# Patient Record
Sex: FEMALE | Race: WHITE | NOT HISPANIC OR LATINO
[De-identification: names, ages, dates, MRNs, and addresses within clinical notes are randomized per-mention and may not be internally consistent; named-entity substitution may affect disease eponyms.]

---

## 2018-04-30 PROBLEM — Z00.00 ENCOUNTER FOR PREVENTIVE HEALTH EXAMINATION: Status: ACTIVE | Noted: 2018-04-30

## 2019-01-27 ENCOUNTER — TRANSCRIPTION ENCOUNTER (OUTPATIENT)
Age: 74
End: 2019-01-27

## 2019-08-21 ENCOUNTER — TRANSCRIPTION ENCOUNTER (OUTPATIENT)
Age: 74
End: 2019-08-21

## 2022-06-20 ENCOUNTER — NON-APPOINTMENT (OUTPATIENT)
Age: 77
End: 2022-06-20

## 2022-09-13 ENCOUNTER — NON-APPOINTMENT (OUTPATIENT)
Age: 77
End: 2022-09-13

## 2022-11-21 ENCOUNTER — NON-APPOINTMENT (OUTPATIENT)
Age: 77
End: 2022-11-21

## 2022-11-28 ENCOUNTER — NON-APPOINTMENT (OUTPATIENT)
Age: 77
End: 2022-11-28

## 2022-12-01 ENCOUNTER — NON-APPOINTMENT (OUTPATIENT)
Age: 77
End: 2022-12-01

## 2023-01-22 ENCOUNTER — NON-APPOINTMENT (OUTPATIENT)
Age: 78
End: 2023-01-22

## 2023-05-16 ENCOUNTER — APPOINTMENT (OUTPATIENT)
Dept: ORTHOPEDIC SURGERY | Facility: CLINIC | Age: 78
End: 2023-05-16
Payer: MEDICARE

## 2023-05-16 VITALS — WEIGHT: 138 LBS | BODY MASS INDEX: 23.56 KG/M2 | HEIGHT: 64 IN

## 2023-05-16 PROCEDURE — 99204 OFFICE O/P NEW MOD 45 MIN: CPT | Mod: 25

## 2023-05-16 PROCEDURE — 20550 NJX 1 TENDON SHEATH/LIGAMENT: CPT | Mod: F1

## 2023-05-16 PROCEDURE — 20605 DRAIN/INJ JOINT/BURSA W/O US: CPT | Mod: LT

## 2023-05-16 PROCEDURE — 73130 X-RAY EXAM OF HAND: CPT | Mod: LT

## 2023-05-16 NOTE — IMAGING
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

## 2023-05-16 NOTE — HISTORY OF PRESENT ILLNESS
[10] : 10 [3] : 3 [Burning] : burning [Dull/Aching] : dull/aching [Radiating] : radiating [Sharp] : sharp [Tightness] : tightness [Rest] : rest [Meds] : meds [de-identified] : 5/16/23:  A step fell on the patient's left hand in the end of November.  Pt had laceration that was sutured but she still has pain in her hand. [] : no [FreeTextEntry1] : left hand [FreeTextEntry3] : November 2022 [FreeTextEntry5] : A wooden block fell on the patients hand causing her to get stitches. [FreeTextEntry7] : wrist at times

## 2023-05-30 ENCOUNTER — APPOINTMENT (OUTPATIENT)
Dept: ORTHOPEDIC SURGERY | Facility: CLINIC | Age: 78
End: 2023-05-30
Payer: MEDICARE

## 2023-05-30 VITALS — BODY MASS INDEX: 23.56 KG/M2 | WEIGHT: 138 LBS | HEIGHT: 64 IN

## 2023-05-30 DIAGNOSIS — M19.132 POST-TRAUMATIC OSTEOARTHRITIS, LEFT WRIST: ICD-10-CM

## 2023-05-30 DIAGNOSIS — M72.0 PALMAR FASCIAL FIBROMATOSIS [DUPUYTREN]: ICD-10-CM

## 2023-05-30 DIAGNOSIS — Z86.39 PERSONAL HISTORY OF OTHER ENDOCRINE, NUTRITIONAL AND METABOLIC DISEASE: ICD-10-CM

## 2023-05-30 DIAGNOSIS — M65.342 TRIGGER FINGER, LEFT RING FINGER: ICD-10-CM

## 2023-05-30 DIAGNOSIS — Z87.898 PERSONAL HISTORY OF OTHER SPECIFIED CONDITIONS: ICD-10-CM

## 2023-05-30 DIAGNOSIS — M18.12 UNILATERAL PRIMARY OSTEOARTHRITIS OF FIRST CARPOMETACARPAL JOINT, LEFT HAND: ICD-10-CM

## 2023-05-30 PROCEDURE — 20605 DRAIN/INJ JOINT/BURSA W/O US: CPT | Mod: LT

## 2023-05-30 PROCEDURE — L3908: CPT

## 2023-05-30 PROCEDURE — 99214 OFFICE O/P EST MOD 30 MIN: CPT | Mod: 25

## 2023-05-30 NOTE — HISTORY OF PRESENT ILLNESS
[de-identified] : 5/30/23:  pt no longer has triggering in left index finger after CSI.  Pt was given CSI in left DRUJ and still has pain.  Pt also has left basal joint pain, pt has not received CSI there.\par \par 5/16/23:  A step fell on the patient's left hand in the end of November.  Pt had laceration that was sutured but she still has pain in her hand.

## 2023-05-30 NOTE — IMAGING
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

## 2023-05-30 NOTE — ASSESSMENT
[FreeTextEntry1] : After a discussion of the risks, benefits and alternatives along with the expectations, the patient was amenable to injection.  The indication for injection is pain, inflammation and radiographically confirmed arthritis.  The skin overlying the carpometacarpal joint was prepared with alcohol and ethyl chloride was sprayed topically.  Sterile technique was used. An injection of 1ml of lidocaine and 6mg of betamethasone was used.  The patient was instructed to call if redness, pain or fever occur.  They may apply ice for 15 minutes eery hour for the next 12-24 hours as tolerated.  .  The patient understands that it may take 2-5 days to see a noticeable difference.  Sterile Band-Aid was applied.\par \par Pt was given CSI in left basal joint today.\par Pt will wear wrist brace

## 2023-07-13 ENCOUNTER — OFFICE (OUTPATIENT)
Facility: LOCATION | Age: 78
Setting detail: OPHTHALMOLOGY
End: 2023-07-13
Payer: MEDICARE

## 2023-07-13 DIAGNOSIS — H02.831: ICD-10-CM

## 2023-07-13 DIAGNOSIS — H40.013: ICD-10-CM

## 2023-07-13 DIAGNOSIS — H02.834: ICD-10-CM

## 2023-07-13 DIAGNOSIS — H43.813: ICD-10-CM

## 2023-07-13 DIAGNOSIS — H16.223: ICD-10-CM

## 2023-07-13 DIAGNOSIS — H11.823: ICD-10-CM

## 2023-07-13 DIAGNOSIS — H81.4: ICD-10-CM

## 2023-07-13 PROCEDURE — 92133 CPTRZD OPH DX IMG PST SGM ON: CPT | Performed by: OPHTHALMOLOGY

## 2023-07-13 PROCEDURE — 92202 OPSCPY EXTND ON/MAC DRAW: CPT | Performed by: OPHTHALMOLOGY

## 2023-07-13 PROCEDURE — 92014 COMPRE OPH EXAM EST PT 1/>: CPT | Performed by: OPHTHALMOLOGY

## 2023-07-13 ASSESSMENT — REFRACTION_MANIFEST
OD_ADD: +2.75
OD_VA1: 20/20
OD_CYLINDER: -1.25
OD_AXIS: 125
OD_VA1: 20/20
OS_AXIS: 85
OD_VA2: 20/20
OD_SPHERE: +0.50
OS_ADD: +2.75
OU_VA: 20/15-2
OS_VA1: 20/20
OD_CYLINDER: -1.25
OS_SPHERE: +0.25
OS_CYLINDER: -0.75
OD_AXIS: 120
OD_SPHERE: +0.75

## 2023-07-13 ASSESSMENT — REFRACTION_AUTOREFRACTION
OD_AXIS: 103
OD_SPHERE: +1.00
OS_AXIS: 078
OS_CYLINDER: -1.75
OD_CYLINDER: -2.00
OS_SPHERE: +1.00

## 2023-07-13 ASSESSMENT — TONOMETRY
OS_IOP_MMHG: 18
OD_IOP_MMHG: 19

## 2023-07-13 ASSESSMENT — KERATOMETRY
OS_K2POWER_DIOPTERS: 45.00
OS_K1POWER_DIOPTERS: 46.00
OS_AXISANGLE_DEGREES: 071
OD_K2POWER_DIOPTERS: 44.00
OD_AXISANGLE_DEGREES: 106
OD_K1POWER_DIOPTERS: 45.75

## 2023-07-13 ASSESSMENT — REFRACTION_CURRENTRX
OD_CYLINDER: -1.25
OS_SPHERE: +0.25
OD_AXIS: 128
OD_SPHERE: +0.50
OS_CYLINDER: -0.75
OD_OVR_VA: 20/
OS_AXIS: 002
OS_OVR_VA: 20/

## 2023-07-13 ASSESSMENT — SUPERFICIAL PUNCTATE KERATITIS (SPK)
OD_SPK: 2+
OS_SPK: 2+

## 2023-07-13 ASSESSMENT — SPHEQUIV_DERIVED
OD_SPHEQUIV: 0
OS_SPHEQUIV: -0.125
OS_SPHEQUIV: 0.125
OD_SPHEQUIV: -0.125
OD_SPHEQUIV: 0.125

## 2023-07-13 ASSESSMENT — AXIALLENGTH_DERIVED
OD_AL: 23.1442
OS_AL: 22.9254
OD_AL: 23.0974
OS_AL: 22.8338
OD_AL: 23.0509

## 2023-07-13 ASSESSMENT — LID EXAM ASSESSMENTS: OD_COMMENTS: INFERIOR PLUG IN PLACE

## 2023-07-13 ASSESSMENT — CONFRONTATIONAL VISUAL FIELD TEST (CVF)
OS_FINDINGS: FULL
OD_FINDINGS: FULL

## 2023-07-13 ASSESSMENT — LID POSITION - DERMATOCHALASIS
OS_DERMATOCHALASIS: LUL 3+
OD_DERMATOCHALASIS: RUL 3+

## 2023-07-13 ASSESSMENT — VISUAL ACUITY
OD_BCVA: 20/20-1
OS_BCVA: 20/20

## 2023-09-20 ENCOUNTER — NON-APPOINTMENT (OUTPATIENT)
Age: 78
End: 2023-09-20

## 2023-10-16 ENCOUNTER — NON-APPOINTMENT (OUTPATIENT)
Age: 78
End: 2023-10-16

## 2023-11-07 ENCOUNTER — NON-APPOINTMENT (OUTPATIENT)
Age: 78
End: 2023-11-07

## 2023-11-10 ENCOUNTER — NON-APPOINTMENT (OUTPATIENT)
Age: 78
End: 2023-11-10

## 2023-11-13 PROBLEM — Z80.8 FAMILY HISTORY OF MALIGNANT NEOPLASM OF THYROID: Status: ACTIVE | Noted: 2023-11-13

## 2023-11-15 ENCOUNTER — APPOINTMENT (OUTPATIENT)
Dept: GYNECOLOGIC ONCOLOGY | Facility: CLINIC | Age: 78
End: 2023-11-15
Payer: MEDICARE

## 2023-11-15 VITALS
WEIGHT: 135 LBS | DIASTOLIC BLOOD PRESSURE: 67 MMHG | HEART RATE: 67 BPM | SYSTOLIC BLOOD PRESSURE: 142 MMHG | BODY MASS INDEX: 23.17 KG/M2

## 2023-11-15 DIAGNOSIS — Z80.8 FAMILY HISTORY OF MALIGNANT NEOPLASM OF OTHER ORGANS OR SYSTEMS: ICD-10-CM

## 2023-11-15 PROCEDURE — 99204 OFFICE O/P NEW MOD 45 MIN: CPT

## 2023-11-15 RX ORDER — LEVOTHYROXINE SODIUM 88 UG/1
88 TABLET ORAL
Refills: 0 | Status: ACTIVE | COMMUNITY

## 2023-11-17 ENCOUNTER — NON-APPOINTMENT (OUTPATIENT)
Age: 78
End: 2023-11-17

## 2023-11-27 ENCOUNTER — RESULT REVIEW (OUTPATIENT)
Age: 78
End: 2023-11-27

## 2023-11-27 ENCOUNTER — NON-APPOINTMENT (OUTPATIENT)
Age: 78
End: 2023-11-27

## 2023-11-27 ENCOUNTER — OUTPATIENT (OUTPATIENT)
Dept: OUTPATIENT SERVICES | Facility: HOSPITAL | Age: 78
LOS: 1 days | End: 2023-11-27
Payer: COMMERCIAL

## 2023-11-27 DIAGNOSIS — N95.0 POSTMENOPAUSAL BLEEDING: ICD-10-CM

## 2023-11-27 PROCEDURE — 88321 CONSLTJ&REPRT SLD PREP ELSWR: CPT

## 2023-12-05 ENCOUNTER — APPOINTMENT (OUTPATIENT)
Dept: GYNECOLOGIC ONCOLOGY | Facility: CLINIC | Age: 78
End: 2023-12-05
Payer: MEDICARE

## 2023-12-05 VITALS
SYSTOLIC BLOOD PRESSURE: 150 MMHG | WEIGHT: 130 LBS | BODY MASS INDEX: 22.2 KG/M2 | DIASTOLIC BLOOD PRESSURE: 78 MMHG | HEART RATE: 69 BPM | HEIGHT: 64 IN

## 2023-12-05 PROCEDURE — 99214 OFFICE O/P EST MOD 30 MIN: CPT

## 2023-12-06 ENCOUNTER — OUTPATIENT (OUTPATIENT)
Dept: OUTPATIENT SERVICES | Facility: HOSPITAL | Age: 78
LOS: 1 days | End: 2023-12-06
Payer: MEDICARE

## 2023-12-06 VITALS
HEART RATE: 68 BPM | TEMPERATURE: 98 F | SYSTOLIC BLOOD PRESSURE: 155 MMHG | DIASTOLIC BLOOD PRESSURE: 77 MMHG | RESPIRATION RATE: 16 BRPM | HEIGHT: 63 IN | OXYGEN SATURATION: 99 % | WEIGHT: 130.07 LBS

## 2023-12-06 DIAGNOSIS — Z90.11 ACQUIRED ABSENCE OF RIGHT BREAST AND NIPPLE: Chronic | ICD-10-CM

## 2023-12-06 DIAGNOSIS — C54.1 MALIGNANT NEOPLASM OF ENDOMETRIUM: ICD-10-CM

## 2023-12-06 DIAGNOSIS — I10 ESSENTIAL (PRIMARY) HYPERTENSION: ICD-10-CM

## 2023-12-06 DIAGNOSIS — C73 MALIGNANT NEOPLASM OF THYROID GLAND: ICD-10-CM

## 2023-12-06 DIAGNOSIS — E89.0 POSTPROCEDURAL HYPOTHYROIDISM: Chronic | ICD-10-CM

## 2023-12-06 DIAGNOSIS — C50.919 MALIGNANT NEOPLASM OF UNSPECIFIED SITE OF UNSPECIFIED FEMALE BREAST: ICD-10-CM

## 2023-12-06 DIAGNOSIS — E78.5 HYPERLIPIDEMIA, UNSPECIFIED: ICD-10-CM

## 2023-12-06 DIAGNOSIS — Z98.890 OTHER SPECIFIED POSTPROCEDURAL STATES: Chronic | ICD-10-CM

## 2023-12-06 DIAGNOSIS — F41.9 ANXIETY DISORDER, UNSPECIFIED: ICD-10-CM

## 2023-12-06 LAB
A1C WITH ESTIMATED AVERAGE GLUCOSE RESULT: 5.7 % — HIGH (ref 4–5.6)
A1C WITH ESTIMATED AVERAGE GLUCOSE RESULT: 5.7 % — HIGH (ref 4–5.6)
ALBUMIN SERPL ELPH-MCNC: 3.4 G/DL — SIGNIFICANT CHANGE UP (ref 3.3–5)
ALBUMIN SERPL ELPH-MCNC: 3.4 G/DL — SIGNIFICANT CHANGE UP (ref 3.3–5)
ALP SERPL-CCNC: 86 U/L — SIGNIFICANT CHANGE UP (ref 40–120)
ALP SERPL-CCNC: 86 U/L — SIGNIFICANT CHANGE UP (ref 40–120)
ALT FLD-CCNC: 5 U/L — SIGNIFICANT CHANGE UP (ref 4–33)
ALT FLD-CCNC: 5 U/L — SIGNIFICANT CHANGE UP (ref 4–33)
ANION GAP SERPL CALC-SCNC: 10 MMOL/L — SIGNIFICANT CHANGE UP (ref 7–14)
ANION GAP SERPL CALC-SCNC: 10 MMOL/L — SIGNIFICANT CHANGE UP (ref 7–14)
AST SERPL-CCNC: 11 U/L — SIGNIFICANT CHANGE UP (ref 4–32)
AST SERPL-CCNC: 11 U/L — SIGNIFICANT CHANGE UP (ref 4–32)
BILIRUB SERPL-MCNC: 0.5 MG/DL — SIGNIFICANT CHANGE UP (ref 0.2–1.2)
BILIRUB SERPL-MCNC: 0.5 MG/DL — SIGNIFICANT CHANGE UP (ref 0.2–1.2)
BLD GP AB SCN SERPL QL: NEGATIVE — SIGNIFICANT CHANGE UP
BLD GP AB SCN SERPL QL: NEGATIVE — SIGNIFICANT CHANGE UP
BUN SERPL-MCNC: 6 MG/DL — LOW (ref 7–23)
BUN SERPL-MCNC: 6 MG/DL — LOW (ref 7–23)
CALCIUM SERPL-MCNC: 8.7 MG/DL — SIGNIFICANT CHANGE UP (ref 8.4–10.5)
CALCIUM SERPL-MCNC: 8.7 MG/DL — SIGNIFICANT CHANGE UP (ref 8.4–10.5)
CHLORIDE SERPL-SCNC: 99 MMOL/L — SIGNIFICANT CHANGE UP (ref 98–107)
CHLORIDE SERPL-SCNC: 99 MMOL/L — SIGNIFICANT CHANGE UP (ref 98–107)
CO2 SERPL-SCNC: 28 MMOL/L — SIGNIFICANT CHANGE UP (ref 22–31)
CO2 SERPL-SCNC: 28 MMOL/L — SIGNIFICANT CHANGE UP (ref 22–31)
CREAT SERPL-MCNC: 0.59 MG/DL — SIGNIFICANT CHANGE UP (ref 0.5–1.3)
CREAT SERPL-MCNC: 0.59 MG/DL — SIGNIFICANT CHANGE UP (ref 0.5–1.3)
EGFR: 93 ML/MIN/1.73M2 — SIGNIFICANT CHANGE UP
EGFR: 93 ML/MIN/1.73M2 — SIGNIFICANT CHANGE UP
ESTIMATED AVERAGE GLUCOSE: 117 — SIGNIFICANT CHANGE UP
ESTIMATED AVERAGE GLUCOSE: 117 — SIGNIFICANT CHANGE UP
GLUCOSE SERPL-MCNC: 88 MG/DL — SIGNIFICANT CHANGE UP (ref 70–99)
GLUCOSE SERPL-MCNC: 88 MG/DL — SIGNIFICANT CHANGE UP (ref 70–99)
HCT VFR BLD CALC: 36.2 % — SIGNIFICANT CHANGE UP (ref 34.5–45)
HCT VFR BLD CALC: 36.2 % — SIGNIFICANT CHANGE UP (ref 34.5–45)
HGB BLD-MCNC: 11.2 G/DL — LOW (ref 11.5–15.5)
HGB BLD-MCNC: 11.2 G/DL — LOW (ref 11.5–15.5)
MCHC RBC-ENTMCNC: 25.9 PG — LOW (ref 27–34)
MCHC RBC-ENTMCNC: 25.9 PG — LOW (ref 27–34)
MCHC RBC-ENTMCNC: 30.9 GM/DL — LOW (ref 32–36)
MCHC RBC-ENTMCNC: 30.9 GM/DL — LOW (ref 32–36)
MCV RBC AUTO: 83.8 FL — SIGNIFICANT CHANGE UP (ref 80–100)
MCV RBC AUTO: 83.8 FL — SIGNIFICANT CHANGE UP (ref 80–100)
NRBC # BLD: 0 /100 WBCS — SIGNIFICANT CHANGE UP (ref 0–0)
NRBC # BLD: 0 /100 WBCS — SIGNIFICANT CHANGE UP (ref 0–0)
NRBC # FLD: 0 K/UL — SIGNIFICANT CHANGE UP (ref 0–0)
NRBC # FLD: 0 K/UL — SIGNIFICANT CHANGE UP (ref 0–0)
PLATELET # BLD AUTO: 448 K/UL — HIGH (ref 150–400)
PLATELET # BLD AUTO: 448 K/UL — HIGH (ref 150–400)
POTASSIUM SERPL-MCNC: 4.2 MMOL/L — SIGNIFICANT CHANGE UP (ref 3.5–5.3)
POTASSIUM SERPL-MCNC: 4.2 MMOL/L — SIGNIFICANT CHANGE UP (ref 3.5–5.3)
POTASSIUM SERPL-SCNC: 4.2 MMOL/L — SIGNIFICANT CHANGE UP (ref 3.5–5.3)
POTASSIUM SERPL-SCNC: 4.2 MMOL/L — SIGNIFICANT CHANGE UP (ref 3.5–5.3)
PROT SERPL-MCNC: 7.1 G/DL — SIGNIFICANT CHANGE UP (ref 6–8.3)
PROT SERPL-MCNC: 7.1 G/DL — SIGNIFICANT CHANGE UP (ref 6–8.3)
RBC # BLD: 4.32 M/UL — SIGNIFICANT CHANGE UP (ref 3.8–5.2)
RBC # BLD: 4.32 M/UL — SIGNIFICANT CHANGE UP (ref 3.8–5.2)
RBC # FLD: 16.1 % — HIGH (ref 10.3–14.5)
RBC # FLD: 16.1 % — HIGH (ref 10.3–14.5)
RH IG SCN BLD-IMP: POSITIVE — SIGNIFICANT CHANGE UP
SODIUM SERPL-SCNC: 137 MMOL/L — SIGNIFICANT CHANGE UP (ref 135–145)
SODIUM SERPL-SCNC: 137 MMOL/L — SIGNIFICANT CHANGE UP (ref 135–145)
SURGICAL PATHOLOGY STUDY: SIGNIFICANT CHANGE UP
SURGICAL PATHOLOGY STUDY: SIGNIFICANT CHANGE UP
WBC # BLD: 13.03 K/UL — HIGH (ref 3.8–10.5)
WBC # BLD: 13.03 K/UL — HIGH (ref 3.8–10.5)
WBC # FLD AUTO: 13.03 K/UL — HIGH (ref 3.8–10.5)
WBC # FLD AUTO: 13.03 K/UL — HIGH (ref 3.8–10.5)

## 2023-12-06 PROCEDURE — 93010 ELECTROCARDIOGRAM REPORT: CPT

## 2023-12-06 NOTE — H&P PST ADULT - PROBLEM SELECTOR PLAN 1
Patient tentatively scheduled for exploratory laparotomy, total abdominal hysterectomy, bilateral salpingo oophorectomy, sentinel lymph node mapping , staging on 12/11/23.    Pre-op instructions provided. Pt given verbal and written instructions with teach back on chlorhexidine wash and pepcid. Pt verbalized understanding with return demonstration.

## 2023-12-06 NOTE — H&P PST ADULT - PROBLEM SELECTOR PLAN 3
Patient instructed to take with a sip of water on the morning of procedure. Patient instructed to take Crestor with a sip of water on the morning of procedure.

## 2023-12-06 NOTE — H&P PST ADULT - NSICDXPASTMEDICALHX_GEN_ALL_CORE_FT
No PAST MEDICAL HISTORY:  Breast cancer     HLD (hyperlipidemia)     HTN (hypertension)     Thyroid cancer      PAST MEDICAL HISTORY:  Anxiety     Breast cancer     HLD (hyperlipidemia)     HTN (hypertension)     Thyroid cancer

## 2023-12-06 NOTE — H&P PST ADULT - PROBLEM SELECTOR PLAN 2
Patient instructed to take with a sip of water on the morning of procedure.     Patient scheduled for medical evaluation on 12/7/23 as per surgeon, Copy requested. Patient instructed to take propanolol with a sip of water on the morning of procedure.     Patient scheduled for medical evaluation on 12/7/23 as per surgeon, Copy requested.

## 2023-12-06 NOTE — H&P PST ADULT - NSICDXPASTSURGICALHX_GEN_ALL_CORE_FT
PAST SURGICAL HISTORY:  H/O right mastectomy     H/O total thyroidectomy      PAST SURGICAL HISTORY:  H/O hand surgery     H/O right mastectomy     H/O total thyroidectomy

## 2023-12-06 NOTE — H&P PST ADULT - NSANTHOSAYNRD_GEN_A_CORE
No. VENANCIO screening performed.  STOP BANG Legend: 0-2 = LOW Risk; 3-4 = INTERMEDIATE Risk; 5-8 = HIGH Risk

## 2023-12-06 NOTE — H&P PST ADULT - PROBLEM SELECTOR PLAN 4
Patient with thyroid cancer s/p thyroidectomy   Patient instructed to take with a sip of water on the morning of procedure. Patient with thyroid cancer s/p thyroidectomy   Patient instructed to take synthroid with a sip of water on the morning of procedure.

## 2023-12-06 NOTE — H&P PST ADULT - HISTORY OF PRESENT ILLNESS
78 year old female with PMH of HTN, HLD, thyroid cancer (2013 s/p thyroidectomy), breast cancer (2014 right mastectomy)  presents to Presurgical testing with diagnosis of  malignant neoplasm of endometrium  scheduled for  exploratory laparotomy, total abdominal hysterectomy, bilateral salpingo oophorectomy, sentinel lymph node mapping , staging 78 year old female with PMH of HTN, HLD, thyroid cancer (2013 s/p thyroidectomy), breast cancer (2014 right mastectomy) , anxiety presents to Presurgical testing with diagnosis of  malignant neoplasm of endometrium  scheduled for  exploratory laparotomy, total abdominal hysterectomy, bilateral salpingo oophorectomy, sentinel lymph node mapping, staging

## 2023-12-10 ENCOUNTER — TRANSCRIPTION ENCOUNTER (OUTPATIENT)
Age: 78
End: 2023-12-10

## 2023-12-11 ENCOUNTER — TRANSCRIPTION ENCOUNTER (OUTPATIENT)
Age: 78
End: 2023-12-11

## 2023-12-11 ENCOUNTER — INPATIENT (INPATIENT)
Facility: HOSPITAL | Age: 78
LOS: 2 days | Discharge: HOME | End: 2023-12-14
Attending: OBSTETRICS & GYNECOLOGY | Admitting: OBSTETRICS & GYNECOLOGY
Payer: MEDICARE

## 2023-12-11 ENCOUNTER — RESULT REVIEW (OUTPATIENT)
Age: 78
End: 2023-12-11

## 2023-12-11 ENCOUNTER — APPOINTMENT (OUTPATIENT)
Dept: GYNECOLOGIC ONCOLOGY | Facility: HOSPITAL | Age: 78
End: 2023-12-11

## 2023-12-11 VITALS
WEIGHT: 130.07 LBS | SYSTOLIC BLOOD PRESSURE: 140 MMHG | HEIGHT: 63 IN | TEMPERATURE: 98 F | HEART RATE: 67 BPM | OXYGEN SATURATION: 100 % | RESPIRATION RATE: 18 BRPM | DIASTOLIC BLOOD PRESSURE: 62 MMHG

## 2023-12-11 DIAGNOSIS — Z90.11 ACQUIRED ABSENCE OF RIGHT BREAST AND NIPPLE: Chronic | ICD-10-CM

## 2023-12-11 DIAGNOSIS — Z98.890 OTHER SPECIFIED POSTPROCEDURAL STATES: Chronic | ICD-10-CM

## 2023-12-11 DIAGNOSIS — C54.1 MALIGNANT NEOPLASM OF ENDOMETRIUM: ICD-10-CM

## 2023-12-11 DIAGNOSIS — E89.0 POSTPROCEDURAL HYPOTHYROIDISM: Chronic | ICD-10-CM

## 2023-12-11 LAB
GLUCOSE BLDC GLUCOMTR-MCNC: 98 MG/DL — SIGNIFICANT CHANGE UP (ref 70–99)
GLUCOSE BLDC GLUCOMTR-MCNC: 98 MG/DL — SIGNIFICANT CHANGE UP (ref 70–99)

## 2023-12-11 PROCEDURE — 88112 CYTOPATH CELL ENHANCE TECH: CPT | Mod: 26

## 2023-12-11 PROCEDURE — 88309 TISSUE EXAM BY PATHOLOGIST: CPT | Mod: 26

## 2023-12-11 PROCEDURE — 88342 IMHCHEM/IMCYTCHM 1ST ANTB: CPT | Mod: 26

## 2023-12-11 PROCEDURE — 58200 EXTENSIVE HYSTERECTOMY: CPT

## 2023-12-11 PROCEDURE — 88305 TISSUE EXAM BY PATHOLOGIST: CPT | Mod: 26

## 2023-12-11 PROCEDURE — 88307 TISSUE EXAM BY PATHOLOGIST: CPT | Mod: 26

## 2023-12-11 PROCEDURE — 88341 IMHCHEM/IMCYTCHM EA ADD ANTB: CPT | Mod: 26

## 2023-12-11 DEVICE — SURGICEL FIBRILLAR 2 X 4": Type: IMPLANTABLE DEVICE | Status: FUNCTIONAL

## 2023-12-11 RX ORDER — SODIUM CHLORIDE 9 MG/ML
3 INJECTION INTRAMUSCULAR; INTRAVENOUS; SUBCUTANEOUS EVERY 8 HOURS
Refills: 0 | Status: DISCONTINUED | OUTPATIENT
Start: 2023-12-11 | End: 2023-12-14

## 2023-12-11 RX ORDER — LEVOTHYROXINE SODIUM 125 MCG
1 TABLET ORAL
Refills: 0 | DISCHARGE

## 2023-12-11 RX ORDER — SODIUM CHLORIDE 9 MG/ML
1000 INJECTION, SOLUTION INTRAVENOUS
Refills: 0 | Status: DISCONTINUED | OUTPATIENT
Start: 2023-12-11 | End: 2023-12-13

## 2023-12-11 RX ORDER — ONDANSETRON 8 MG/1
4 TABLET, FILM COATED ORAL EVERY 6 HOURS
Refills: 0 | Status: DISCONTINUED | OUTPATIENT
Start: 2023-12-11 | End: 2023-12-14

## 2023-12-11 RX ORDER — PROPRANOLOL HCL 160 MG
1 CAPSULE, EXTENDED RELEASE 24HR ORAL
Refills: 0 | DISCHARGE

## 2023-12-11 RX ORDER — ONDANSETRON 8 MG/1
4 TABLET, FILM COATED ORAL ONCE
Refills: 0 | Status: DISCONTINUED | OUTPATIENT
Start: 2023-12-11 | End: 2023-12-11

## 2023-12-11 RX ORDER — HEPARIN SODIUM 5000 [USP'U]/ML
5000 INJECTION INTRAVENOUS; SUBCUTANEOUS EVERY 8 HOURS
Refills: 0 | Status: DISCONTINUED | OUTPATIENT
Start: 2023-12-11 | End: 2023-12-14

## 2023-12-11 RX ORDER — OXYCODONE HYDROCHLORIDE 5 MG/1
5 TABLET ORAL
Refills: 0 | Status: DISCONTINUED | OUTPATIENT
Start: 2023-12-11 | End: 2023-12-12

## 2023-12-11 RX ORDER — ESCITALOPRAM OXALATE 10 MG/1
1 TABLET, FILM COATED ORAL
Refills: 0 | DISCHARGE

## 2023-12-11 RX ORDER — SENNA PLUS 8.6 MG/1
1 TABLET ORAL AT BEDTIME
Refills: 0 | Status: DISCONTINUED | OUTPATIENT
Start: 2023-12-11 | End: 2023-12-14

## 2023-12-11 RX ORDER — KETOROLAC TROMETHAMINE 30 MG/ML
30 SYRINGE (ML) INJECTION EVERY 8 HOURS
Refills: 0 | Status: DISCONTINUED | OUTPATIENT
Start: 2023-12-11 | End: 2023-12-12

## 2023-12-11 RX ORDER — SODIUM CHLORIDE 9 MG/ML
1000 INJECTION, SOLUTION INTRAVENOUS
Refills: 0 | Status: DISCONTINUED | OUTPATIENT
Start: 2023-12-11 | End: 2023-12-12

## 2023-12-11 RX ORDER — CHLORHEXIDINE GLUCONATE 213 G/1000ML
1 SOLUTION TOPICAL EVERY 12 HOURS
Refills: 0 | Status: DISCONTINUED | OUTPATIENT
Start: 2023-12-11 | End: 2023-12-14

## 2023-12-11 RX ORDER — OXYCODONE HYDROCHLORIDE 5 MG/1
10 TABLET ORAL EVERY 4 HOURS
Refills: 0 | Status: DISCONTINUED | OUTPATIENT
Start: 2023-12-11 | End: 2023-12-12

## 2023-12-11 RX ORDER — APIXABAN 2.5 MG/1
1 TABLET, FILM COATED ORAL
Qty: 56 | Refills: 0
Start: 2023-12-11

## 2023-12-11 RX ORDER — ACETAMINOPHEN 500 MG
1000 TABLET ORAL EVERY 6 HOURS
Refills: 0 | Status: DISCONTINUED | OUTPATIENT
Start: 2023-12-11 | End: 2023-12-12

## 2023-12-11 RX ORDER — HYDROMORPHONE HYDROCHLORIDE 2 MG/ML
0.5 INJECTION INTRAMUSCULAR; INTRAVENOUS; SUBCUTANEOUS
Refills: 0 | Status: DISCONTINUED | OUTPATIENT
Start: 2023-12-11 | End: 2023-12-11

## 2023-12-11 RX ORDER — SIMETHICONE 80 MG/1
80 TABLET, CHEWABLE ORAL EVERY 6 HOURS
Refills: 0 | Status: DISCONTINUED | OUTPATIENT
Start: 2023-12-11 | End: 2023-12-14

## 2023-12-11 RX ORDER — ESCITALOPRAM OXALATE 10 MG/1
20 TABLET, FILM COATED ORAL DAILY
Refills: 0 | Status: DISCONTINUED | OUTPATIENT
Start: 2023-12-11 | End: 2023-12-14

## 2023-12-11 RX ORDER — METOPROLOL TARTRATE 50 MG
5 TABLET ORAL EVERY 6 HOURS
Refills: 0 | Status: DISCONTINUED | OUTPATIENT
Start: 2023-12-11 | End: 2023-12-13

## 2023-12-11 RX ORDER — ROSUVASTATIN CALCIUM 5 MG/1
1 TABLET ORAL
Refills: 0 | DISCHARGE

## 2023-12-11 RX ORDER — ASPIRIN/CALCIUM CARB/MAGNESIUM 324 MG
1 TABLET ORAL
Refills: 0 | DISCHARGE

## 2023-12-11 RX ORDER — LEVOTHYROXINE SODIUM 125 MCG
88 TABLET ORAL DAILY
Refills: 0 | Status: DISCONTINUED | OUTPATIENT
Start: 2023-12-11 | End: 2023-12-14

## 2023-12-11 RX ADMIN — SODIUM CHLORIDE 125 MILLILITER(S): 9 INJECTION, SOLUTION INTRAVENOUS at 22:00

## 2023-12-11 RX ADMIN — SODIUM CHLORIDE 125 MILLILITER(S): 9 INJECTION, SOLUTION INTRAVENOUS at 17:40

## 2023-12-11 RX ADMIN — SODIUM CHLORIDE 30 MILLILITER(S): 9 INJECTION, SOLUTION INTRAVENOUS at 07:52

## 2023-12-11 RX ADMIN — Medication 1 DROP(S): at 18:04

## 2023-12-11 RX ADMIN — Medication 30 MILLIGRAM(S): at 14:14

## 2023-12-11 RX ADMIN — Medication 30 MILLIGRAM(S): at 22:31

## 2023-12-11 RX ADMIN — SODIUM CHLORIDE 3 MILLILITER(S): 9 INJECTION INTRAMUSCULAR; INTRAVENOUS; SUBCUTANEOUS at 22:00

## 2023-12-11 RX ADMIN — Medication 30 MILLIGRAM(S): at 14:30

## 2023-12-11 RX ADMIN — Medication 30 MILLIGRAM(S): at 22:01

## 2023-12-11 RX ADMIN — HEPARIN SODIUM 5000 UNIT(S): 5000 INJECTION INTRAVENOUS; SUBCUTANEOUS at 17:40

## 2023-12-11 RX ADMIN — CHLORHEXIDINE GLUCONATE 1 APPLICATION(S): 213 SOLUTION TOPICAL at 07:53

## 2023-12-11 RX ADMIN — Medication 400 MILLIGRAM(S): at 17:40

## 2023-12-11 NOTE — DISCHARGE NOTE PROVIDER - CARE PROVIDER_API CALL
Alayna Jacques  Gynecologic Oncology  [address] [place] [id number]  Phone: [phone]  Fax: [phone]  Scheduled Appointment: 12/27/2023 02:00 PM   Alayna Jacques  Gynecologic Oncology  [address] [place] [id number]  Phone: [phone]  Fax: [phone]  Scheduled Appointment: 12/27/2023 02:00 PM

## 2023-12-11 NOTE — BRIEF OPERATIVE NOTE - SPECIMENS
Uterus, cervix, bilateral fallopian tubes, and ovaries. Left and right sentinel lymph nodes. Pelvic washings.

## 2023-12-11 NOTE — DISCHARGE NOTE PROVIDER - NSDCCPTREATMENT_GEN_ALL_CORE_FT
PRINCIPAL PROCEDURE  Procedure: Exploratory laparotomy with total abdominal hysterectomy and bilateral salpingo-oophorectomy (DILIP-BSO) with cancer staging  Findings and Treatment:

## 2023-12-11 NOTE — PATIENT PROFILE ADULT - FUNCTIONAL ASSESSMENT - BASIC MOBILITY 6.
3-calculated by average/Not able to assess (calculate score using [facility] averaging method)  3-calculated by average/Not able to assess (calculate score using [facility] averaging method)

## 2023-12-11 NOTE — DISCHARGE NOTE PROVIDER - NSDCFUSCHEDAPPT_GEN_ALL_CORE_FT
Alayna Jacques  [facility]  LIJOP Amb Surg MOR  Scheduled Appointment: 12/14/2023    Alayna Jacques  [facility]  GYNONC 9 [facility]  Scheduled Appointment: 12/27/2023     Alayna Jacques  [facility]  LIJOP Amb Surg MOR  Scheduled Appointment: 12/14/2023    Alayna Jacques  [facility]  GYNONC 9 [facility]  Scheduled Appointment: 12/27/2023     [facility]  GYNONC [address]  Scheduled Appointment: 12/27/2023

## 2023-12-11 NOTE — PATIENT PROFILE ADULT - FALL HARM RISK - HARM RISK INTERVENTIONS
Assistance with ambulation/Assistance OOB with selected safe patient handling equipment/Communicate Risk of Fall with Harm to all staff/Monitor gait and stability/Reinforce activity limits and safety measures with patient and family/Sit up slowly, dangle for a short time, stand at bedside before walking/Tailored Fall Risk Interventions/Use of alarms - bed, chair and/or voice tab/Visual Cue: Yellow wristband and red socks/Bed in lowest position, wheels locked, appropriate side rails in place/Call bell, personal items and telephone in reach/Instruct patient to call for assistance before getting out of bed or chair/Non-slip footwear when patient is out of bed/[place] to call system/Physically safe environment - no spills, clutter or unnecessary equipment/Purposeful Proactive Rounding/Room/bathroom lighting operational, light cord in reach Assistance with ambulation/Assistance OOB with selected safe patient handling equipment/Communicate Risk of Fall with Harm to all staff/Monitor gait and stability/Reinforce activity limits and safety measures with patient and family/Sit up slowly, dangle for a short time, stand at bedside before walking/Tailored Fall Risk Interventions/Use of alarms - bed, chair and/or voice tab/Visual Cue: Yellow wristband and red socks/Bed in lowest position, wheels locked, appropriate side rails in place/Call bell, personal items and telephone in reach/Instruct patient to call for assistance before getting out of bed or chair/Non-slip footwear when patient is out of bed/[place] to call system/Physically safe environment - no spills, clutter or unnecessary equipment/Purposeful Proactive Rounding/Room/bathroom lighting operational, light cord in reach

## 2023-12-11 NOTE — DISCHARGE NOTE PROVIDER - NSDCCPCAREPLAN_GEN_ALL_CORE_FT
PRINCIPAL DISCHARGE DIAGNOSIS  Diagnosis: Malignant neoplasm of endometrium  Assessment and Plan of Treatment:

## 2023-12-11 NOTE — BRIEF OPERATIVE NOTE - OPERATION/FINDINGS
EUA: Approx 14wk size mobile, anteverted uterus.   Operative: Bulky, enlarged uterus with multiple small subserosal fibroids. Enlarged cervix without discrete mass visible. Grossly normal bilateral fallopian tubes and ovaries. ICG injected into cervix and sentinel nodes successfully mapped bilaterally and biopsied. No other disease noted in abdomen or pelvis.

## 2023-12-11 NOTE — DISCHARGE NOTE PROVIDER - NSDCFUADDINST_GEN_ALL_CORE_FT
Postoperative Instructions   Pain control  For pain control, take the followin. Motrin 600mg four times a day, take with food  2. Add Tylenol 975 four times a day, alternated with motrin  3. Add oxycodone as needed for severe pain not managed well by motrin and tylenol. A prescription has been sent to your pharmacy on file.   Motrin and Tylenol can be obtained over the counter.    Blood Clot Prevention  While in the hospital, you have been receiving daily injections to prevent blood clots from forming in your legs or lungs. After discharge, you will be started on Apixaban, an oral medication to prevent blood clots. Continue this medication as prescribed for 4 weeks. A prescription for the medication has been sent to your pharmacy on file. Please call Dr. Jacques's office if you have any questions.    Postoperative restrictions  Do not drive or make important decisions for 24 hours after anesthesia. You may feel drowsy for 24 hours and should have a responsible adult with you during that time. Nothing in the vagina (tampons, sexual intercourse), No tub baths, pools or hot tubs for 8 weeks (showers are ok!). No lifting anything heavier than 15 lbs, no strenuous exercise for 8 weeks after surgery. Do not pull or cut any stitches that you see around your incision.    Vaginal bleeding  Spotting and intermittent passage of blood clots per vagina is normal in first few weeks after surgery. If you are soaking 1 pad per hour, that is not normal and you should notify Dr. Jacques's office and seek medical attention right away.   Vaginal discharge  Vaginal discharge (all colors) is normal after vaginal surgery. If you’ve had vaginal surgery, you have sutures in your vagina which take 3 months to fully absorb. You may have vaginal discharge during this time. This is normal.    Signs of Infection  Some postoperative pain and discomfort is normal. However, if you experience any of the following, you may be developing an infection and should be seen by your doctor: pain that does not get better with the oral medications listed above, fever greater than 100.4F, foul smelling discharge coming from the surgical site, nausea and vomiting that does not stop (especially if you are unable to tolerate oral intake), or inability to urinate. If you experience any of these symptoms, call your doctor's office to be seen right away.    Follow Up  Attend your postoperative appointment with Dr. Jacques( at 2pm). The procedure will be discussed with you at that time. Postoperative Instructions   Pain control  For pain control, take the followin. Motrin 600mg four times a day, take with food  2. Add Tylenol 975 four times a day, alternated with Motrin  3. Add oxycodone as needed for severe pain not managed well by Motrin and Tylenol. A prescription has been sent to your pharmacy on file.   Motrin and Tylenol can be obtained over the counter.    Blood Clot Prevention  While in the hospital, you have been receiving daily injections to prevent blood clots from forming in your legs or lungs. After discharge, you will be started on Apixaban, an oral medication to prevent blood clots. Continue this medication as prescribed for 4 weeks. A prescription for the medication has been sent to your pharmacy on file. Please call Dr. Jacques's office if you have any questions.    Postoperative restrictions  Do not drive or make important decisions for 24 hours after anesthesia. You may feel drowsy for 24 hours and should have a responsible adult with you during that time. Nothing in the vagina (tampons, sexual intercourse), No tub baths, pools or hot tubs for 8 weeks (showers are ok!). No lifting anything heavier than 15 lbs, no strenuous exercise for 8 weeks after surgery. Do not pull or cut any stitches that you see around your incision.    Vaginal bleeding  Spotting and intermittent passage of blood clots per vagina is normal in first few weeks after surgery. If you are soaking 1 pad per hour, that is not normal and you should notify Dr. Jacques's office and seek medical attention right away.   Vaginal discharge  Vaginal discharge (all colors) is normal after vaginal surgery. If you’ve had vaginal surgery, you have sutures in your vagina which take 3 months to fully absorb. You may have vaginal discharge during this time. This is normal.    Signs of Infection  Some postoperative pain and discomfort is normal. However, if you experience any of the following, you may be developing an infection and should be seen by your doctor: pain that does not get better with the oral medications listed above, fever greater than 100.4F, foul smelling discharge coming from the surgical site, nausea and vomiting that does not stop (especially if you are unable to tolerate oral intake), or inability to urinate. If you experience any of these symptoms, call your doctor's office to be seen right away.    Follow Up  Attend your postoperative appointment with Dr. Jacques( at 2pm). The procedure will be discussed with you at that time.

## 2023-12-11 NOTE — ASU PREOP CHECKLIST - COMMENTS
Inderal, synthroid, pepcid @ 05:00 Inderal, synthroid, Pepcid @ 05:00 Inderal, synthroid, Pepcid, celexa @ 05:00

## 2023-12-11 NOTE — CHART NOTE - NSCHARTNOTEFT_GEN_A_CORE
Patient seen and examined at bedside, recently post-op. No acute complaints at this time. Denies CP, SOB, N/V, fevers, and chills.    Vital Signs Last 24 Hours  T(C): 36.4 (12-11-23 @ 12:45), Max: 36.5 (12-11-23 @ 06:59)  HR: 63 (12-11-23 @ 14:15) (59 - 70)  BP: 159/65 (12-11-23 @ 14:15) (126/75 - 159/65)  RR: 20 (12-11-23 @ 14:15) (11 - 20)  SpO2: 98% (12-11-23 @ 14:15) (94% - 100%)    I&O's Summary      Physical Exam:  General: NAD  CV: NR, RR, S1, S2, no M/R/G  Lungs: CTA-B  Abdomen: Soft, appropriately tender, non-distended, +BS  Incision: midline incision CDI Prevena dressing in place  : anguiano in place draining clear urine   Ext: No pain or swelling, B/L SCDs in place     Labs:      MEDICATIONS  (STANDING):  acetaminophen   IVPB .. 1000 milliGRAM(s) IV Intermittent every 6 hours  chlorhexidine 2% Cloths 1 Application(s) Topical every 12 hours  escitalopram 20 milliGRAM(s) Oral daily  heparin   Injectable 5000 Unit(s) SubCutaneous every 8 hours  ketorolac   Injectable 30 milliGRAM(s) IV Push every 8 hours  lactated ringers. 1000 milliLiter(s) (30 mL/Hr) IV Continuous <Continuous>  lactated ringers. 1000 milliLiter(s) (125 mL/Hr) IV Continuous <Continuous>  levothyroxine 88 MICROGram(s) Oral daily  sodium chloride 0.9% lock flush 3 milliLiter(s) IV Push every 8 hours    MEDICATIONS  (PRN):  HYDROmorphone  Injectable 0.5 milliGRAM(s) IV Push every 10 minutes PRN Moderate Pain (4 - 6)  metoprolol tartrate Injectable 5 milliGRAM(s) IV Push every 6 hours PRN systolic >160 or diastolic >110  ondansetron Injectable 4 milliGRAM(s) IV Push once PRN Nausea and/or Vomiting  ondansetron Injectable 4 milliGRAM(s) IV Push every 6 hours PRN Nausea and/or Vomiting  oxyCODONE    IR 5 milliGRAM(s) Oral every 3 hours PRN Moderate Pain (4 - 6)  oxyCODONE    IR 10 milliGRAM(s) Oral every 4 hours PRN Severe Pain (7 - 10)  senna 1 Tablet(s) Oral at bedtime PRN Constipation  simethicone 80 milliGRAM(s) Chew every 6 hours PRN Gas      77yo s/p Ex-Lap DILIP, BSO, SNLM+sampling (EBL:100). Patient recovering well in acute post-operative state.    Neuro: Pain well-controlled. IV Tylenol, Toradol, Oxy PRN.   Psych: Continue with home escitalopram.  CV: Hemodynamically stable. Lopressor PRN. F/u AM CBC.   Pulm: Encourage oob/ambulation, incentive spirometer at bedside  GI: Tolerating clears. Regular Diet. Advance diet as tolerated. Senna, Simethicone, Zofran PRN.   : Anguiano in place. Remove in AM.   Heme: HSQ and SCDs for DVT PPX  Fen: F/u AM BMP/Mg/Phos.   ID: Afebrile.   Endo: C/w home Synthroid. No active issues  Dispo: Continue routine post-op care    Norbert Molina, PGY-1 Patient seen and examined at bedside, recently post-op. No acute complaints at this time. Denies CP, SOB, N/V, fevers, and chills.    Vital Signs Last 24 Hours  T(C): 36.4 (12-11-23 @ 12:45), Max: 36.5 (12-11-23 @ 06:59)  HR: 63 (12-11-23 @ 14:15) (59 - 70)  BP: 159/65 (12-11-23 @ 14:15) (126/75 - 159/65)  RR: 20 (12-11-23 @ 14:15) (11 - 20)  SpO2: 98% (12-11-23 @ 14:15) (94% - 100%)    I&O's Summary      Physical Exam:  General: NAD  CV: NR, RR, S1, S2, no M/R/G  Lungs: CTA-B  Abdomen: Soft, appropriately tender, non-distended, +BS  Incision: midline incision CDI Prevena dressing in place  : anguiano in place draining clear urine   Ext: No pain or swelling, B/L SCDs in place     Labs:      MEDICATIONS  (STANDING):  acetaminophen   IVPB .. 1000 milliGRAM(s) IV Intermittent every 6 hours  chlorhexidine 2% Cloths 1 Application(s) Topical every 12 hours  escitalopram 20 milliGRAM(s) Oral daily  heparin   Injectable 5000 Unit(s) SubCutaneous every 8 hours  ketorolac   Injectable 30 milliGRAM(s) IV Push every 8 hours  lactated ringers. 1000 milliLiter(s) (30 mL/Hr) IV Continuous <Continuous>  lactated ringers. 1000 milliLiter(s) (125 mL/Hr) IV Continuous <Continuous>  levothyroxine 88 MICROGram(s) Oral daily  sodium chloride 0.9% lock flush 3 milliLiter(s) IV Push every 8 hours    MEDICATIONS  (PRN):  HYDROmorphone  Injectable 0.5 milliGRAM(s) IV Push every 10 minutes PRN Moderate Pain (4 - 6)  metoprolol tartrate Injectable 5 milliGRAM(s) IV Push every 6 hours PRN systolic >160 or diastolic >110  ondansetron Injectable 4 milliGRAM(s) IV Push once PRN Nausea and/or Vomiting  ondansetron Injectable 4 milliGRAM(s) IV Push every 6 hours PRN Nausea and/or Vomiting  oxyCODONE    IR 5 milliGRAM(s) Oral every 3 hours PRN Moderate Pain (4 - 6)  oxyCODONE    IR 10 milliGRAM(s) Oral every 4 hours PRN Severe Pain (7 - 10)  senna 1 Tablet(s) Oral at bedtime PRN Constipation  simethicone 80 milliGRAM(s) Chew every 6 hours PRN Gas      79yo s/p Ex-Lap DILIP, BSO, SNLM+sampling (EBL:100). Patient recovering well in acute post-operative state.    Neuro: Pain well-controlled. IV Tylenol, Toradol, Oxy PRN.   Psych: Continue with home escitalopram.  CV: Hemodynamically stable. Lopressor PRN. F/u AM CBC.   Pulm: Encourage oob/ambulation, incentive spirometer at bedside  GI: Tolerating clears. Regular Diet. Advance diet as tolerated. Senna, Simethicone, Zofran PRN.   : Anguiano in place. Remove in AM.   Heme: HSQ and SCDs for DVT PPX  Fen: F/u AM BMP/Mg/Phos.   ID: Afebrile.   Endo: C/w home Synthroid. No active issues  Dispo: Continue routine post-op care    Norbert Molina, PGY-1

## 2023-12-11 NOTE — BRIEF OPERATIVE NOTE - NSICDXBRIEFPROCEDURE_GEN_ALL_CORE_FT
PROCEDURES:  Total abdominal hysterectomy and bilateral salpingo-oophorectomy 11-Dec-2023 12:50:51  Beba Hester  [place] node mapping with biopsy 11-Dec-2023 12:51:23  Beba Hester   PROCEDURES:  Total abdominal hysterectomy and bilateral salpingo-oophorectomy 11-Dec-2023 12:50:51  Beba Hester  [place] node mapping with biopsy 11-Dec-2023 12:51:23  Beba Hester

## 2023-12-11 NOTE — DISCHARGE NOTE PROVIDER - CARE PROVIDERS DIRECT ADDRESSES
[email or web address]

## 2023-12-11 NOTE — DISCHARGE NOTE PROVIDER - NSDCMRMEDTOKEN_GEN_ALL_CORE_FT
aspirin 81 mg oral tablet: 1 tab(s) orally once a day (at bedtime)  Crestor 10 mg oral tablet: 1 tab(s) orally once a day (in the morning)  escitalopram 20 mg oral tablet: 1 tab(s) orally once a day (in the morning)  propranolol 60 mg oral tablet: 1 tab(s) orally once a day (in the morning)  Synthroid 88 mcg (0.088 mg) oral tablet: 1 tab(s) orally once a day (in the morning)   apixaban 2.5 mg oral tablet: 1 tab(s) orally every 12 hours  aspirin 81 mg oral tablet: 1 tab(s) orally once a day (at bedtime)  Crestor 10 mg oral tablet: 1 tab(s) orally once a day (in the morning)  escitalopram 20 mg oral tablet: 1 tab(s) orally once a day (in the morning)  propranolol 60 mg oral tablet: 1 tab(s) orally once a day (in the morning)  Synthroid 88 mcg (0.088 mg) oral tablet: 1 tab(s) orally once a day (in the morning)   aspirin 81 mg oral tablet: 1 tab(s) orally once a day (at bedtime)  Crestor 10 mg oral tablet: 1 tab(s) orally once a day (in the morning)  escitalopram 20 mg oral tablet: 1 tab(s) orally once a day (in the morning)  oxyCODONE 5 mg oral tablet: 1 tab(s) orally every 6 hours as needed for  severe pain MDD: 4 tabs  propranolol 60 mg oral tablet: 1 tab(s) orally once a day (in the morning)  Synthroid 88 mcg (0.088 mg) oral tablet: 1 tab(s) orally once a day (in the morning)

## 2023-12-11 NOTE — DISCHARGE NOTE PROVIDER - HOSPITAL COURSE
78y yo F s/p Ex Lap, DILIP, BSO, [place] Lymph Node Mapping/Sampling on 12/11. See operative report for full details. EBL: 100. Hct: 36.2     Postoperatively pt was advanced to a regular diet and given PO pain meds. Ley remained in place overnight and was removed on POD#1 with patient subsequently voiding spontaneously. Pt was restarted on home medications of Synthroid and Escitalopram while inpatient.    POD#***, pt was discharged in stable condition, ambulating, tolerating po and voiding spontaneously. Pt to have close follow-up w/ Dr. Jacques in clinic on 12/27. 78y yo F s/p Ex Lap, DILIP, BSO, [place] Lymph Node Mapping/Sampling on 12/11. See operative report for full details. EBL: 100. Hct: 36.2     Postoperatively pt was advanced to a regular diet and given PO pain meds. Ley remained in place overnight and was removed on POD#1 with patient subsequently voiding spontaneously. Pt was restarted on home medications of Synthroid and Escitalopram while inpatient.    POD#***, pt was discharged in stable condition, ambulating, tolerating po and voiding spontaneously. Pt to have close follow-up w/ Dr. Jacques in clinic on 12/27. 78y yo F s/p Ex Lap, DILIP, BSO, [place] Lymph Node Mapping/Sampling on 12/11. See operative report for full details. EBL: 100. Hct: 36.2     On POD#0, pt was advanced to a regular diet and given PO pain meds. Ley remained in place overnight and was removed on POD#1 with patient subsequently voiding spontaneously. Pt was restarted on home medications of Synthroid and Escitalopram while inpatient.     On POD#1, Patient with some nausea/vomiting and intolerance of PO.     Upon discharge on POD# , the patient was ambulating, voiding spontaneously, tolerating oral intake, pain was well controlled with oral medication, and vital signs were stable. Patient to have close follow up with Dr. Jacques. 78y yo F s/p Ex Lap, DILIP, BSO, [place] Lymph Node Mapping/Sampling on 12/11. See operative report for full details. EBL: 100. Hct: 36.2     On POD#0, pt was advanced to a regular diet and given PO pain meds. Ley remained in place overnight and was removed on POD#1 with patient subsequently voiding spontaneously. Pt was restarted on home medications of Synthroid and Escitalopram while inpatient.     On POD#1, Patient with some nausea/vomiting and intolerance of PO.     Upon discharge on POD# , the patient was ambulating, voiding spontaneously, tolerating oral intake, pain was well controlled with oral medication, and vital signs were stable. Patient to have close follow up with Dr. Jacques. 78y yo F s/p Ex Lap, DILIP, BSO, [place] Lymph Node Mapping/Sampling on 12/11. See operative report for full details. EBL: 100. Hct: 36.2     On POD#0, pt was advanced to a regular diet and given PO pain meds. Ley remained in place overnight and was removed on POD#1 with patient subsequently voiding spontaneously. Pt was restarted on home medications of Propranolo, Synthroid, and Escitalopram while inpatient. On POD#1-2, Patient with some nausea/vomiting and intolerance of PO. Given Bicitra, slowly advanced diet, with improvement of symptoms and ability to tolerate regular diet without issue.     Upon discharge on POD#3, the patient was ambulating, voiding spontaneously, tolerating oral intake, pain was well controlled with oral medication, and vital signs were stable. Patient to have close follow up with Dr. Jacques. 78y yo F s/p Ex Lap, DILIP, BSO, [place] Lymph Node Mapping/Sampling on 12/11. See operative report for full details. EBL: 100. Hct: 36.2     On POD#0, pt was advanced to a regular diet and given PO pain meds. Ley remained in place overnight and was removed on POD#1 with patient subsequently voiding spontaneously. Pt was restarted on home medications of Propranolo, Synthroid, and Escitalopram while inpatient. On POD#1-2, Patient with some nausea/vomiting and intolerance of PO. Given Bicitra, slowly advanced diet, with improvement of symptoms and ability to tolerate regular diet without issue.     Upon discharge on POD#3, the patient was ambulating, voiding spontaneously, tolerating oral intake, pain was well controlled with oral medication, and vital signs were stable. Patient to have close follow up with Dr. Jacques. 78y yo F s/p Ex Lap, DILIP, BSO, [place] Lymph Node Mapping/Sampling on 12/11. See operative report for full details. EBL: 100. Hct: 36.2     On POD#0, patient was advanced to a regular diet and given PO pain meds. Ley remained in place overnight and was removed on POD#1 with patient subsequently voiding spontaneously. Pt was restarted on home medications of Propranolol, Synthroid, and Escitalopram while inpatient. On POD#1-2, Patient with some nausea/vomiting and intolerance of PO. Given Bicitra, slowly advanced diet, with improvement of symptoms and ability to tolerate regular diet without issue.     Upon discharge on POD#3, the patient was ambulating, voiding spontaneously, tolerating oral intake, pain was well controlled with oral medication, and vital signs were stable. Patient to have close follow up with Dr. Jacques. Patient will go home on AC ppx x28 days for presumed hypercoagulable state.                 9.4    9.72  )-----------( 303      ( 12-14 @ 05:34 )             29.4                10.5   14.55 )-----------( 315      ( 12-13 @ 06:06 )             33.0                10.0   10.57 )-----------( 312      ( 12-12 @ 05:45 )             31.7      78y yo F s/p Ex Lap, DILIP, BSO, [place] Lymph Node Mapping/Sampling on 12/11. See operative report for full details. EBL: 100. Hct: 36.2     On POD#0, patient was advanced to a regular diet and given PO pain meds. Ley remained in place overnight and was removed on POD#1 with patient subsequently voiding spontaneously. Pt was restarted on home medications of Propranolol, Synthroid, and Escitalopram while inpatient. On POD#1-2, Patient with some nausea/vomiting and intolerance of PO. Given Bicitra, slowly advanced diet, with improvement of symptoms and ability to tolerate regular diet without issue.     Upon discharge on POD#3, the patient was ambulating, voiding spontaneously, tolerating oral intake, pain was well controlled with oral medication, and vital signs were stable. Patient to have close follow up with Dr. Jacques. Patient will go home on AC ppx x28 days for presumed hypercoagulable state.                 9.4    9.72  )-----------( 303      ( 12-14 @ 05:34 )             29.4                10.5   14.55 )-----------( 315      ( 12-13 @ 06:06 )             33.0                10.0   10.57 )-----------( 312      ( 12-12 @ 05:45 )             31.7

## 2023-12-12 DIAGNOSIS — Z98.890 OTHER SPECIFIED POSTPROCEDURAL STATES: ICD-10-CM

## 2023-12-12 LAB
ANION GAP SERPL CALC-SCNC: 9 MMOL/L — SIGNIFICANT CHANGE UP (ref 7–14)
ANION GAP SERPL CALC-SCNC: 9 MMOL/L — SIGNIFICANT CHANGE UP (ref 7–14)
BASOPHILS # BLD AUTO: 0.06 K/UL — SIGNIFICANT CHANGE UP (ref 0–0.2)
BASOPHILS # BLD AUTO: 0.06 K/UL — SIGNIFICANT CHANGE UP (ref 0–0.2)
BASOPHILS NFR BLD AUTO: 0.6 % — SIGNIFICANT CHANGE UP (ref 0–2)
BASOPHILS NFR BLD AUTO: 0.6 % — SIGNIFICANT CHANGE UP (ref 0–2)
BLD GP AB SCN SERPL QL: NEGATIVE — SIGNIFICANT CHANGE UP
BLD GP AB SCN SERPL QL: NEGATIVE — SIGNIFICANT CHANGE UP
BUN SERPL-MCNC: 6 MG/DL — LOW (ref 7–23)
BUN SERPL-MCNC: 6 MG/DL — LOW (ref 7–23)
CALCIUM SERPL-MCNC: 7.8 MG/DL — LOW (ref 8.4–10.5)
CALCIUM SERPL-MCNC: 7.8 MG/DL — LOW (ref 8.4–10.5)
CHLORIDE SERPL-SCNC: 99 MMOL/L — SIGNIFICANT CHANGE UP (ref 98–107)
CHLORIDE SERPL-SCNC: 99 MMOL/L — SIGNIFICANT CHANGE UP (ref 98–107)
CO2 SERPL-SCNC: 24 MMOL/L — SIGNIFICANT CHANGE UP (ref 22–31)
CO2 SERPL-SCNC: 24 MMOL/L — SIGNIFICANT CHANGE UP (ref 22–31)
CREAT SERPL-MCNC: 0.57 MG/DL — SIGNIFICANT CHANGE UP (ref 0.5–1.3)
CREAT SERPL-MCNC: 0.57 MG/DL — SIGNIFICANT CHANGE UP (ref 0.5–1.3)
EGFR: 94 ML/MIN/1.73M2 — SIGNIFICANT CHANGE UP
EGFR: 94 ML/MIN/1.73M2 — SIGNIFICANT CHANGE UP
EOSINOPHIL # BLD AUTO: 0.17 K/UL — SIGNIFICANT CHANGE UP (ref 0–0.5)
EOSINOPHIL # BLD AUTO: 0.17 K/UL — SIGNIFICANT CHANGE UP (ref 0–0.5)
EOSINOPHIL NFR BLD AUTO: 1.6 % — SIGNIFICANT CHANGE UP (ref 0–6)
EOSINOPHIL NFR BLD AUTO: 1.6 % — SIGNIFICANT CHANGE UP (ref 0–6)
GLUCOSE SERPL-MCNC: 94 MG/DL — SIGNIFICANT CHANGE UP (ref 70–99)
GLUCOSE SERPL-MCNC: 94 MG/DL — SIGNIFICANT CHANGE UP (ref 70–99)
HCT VFR BLD CALC: 31.7 % — LOW (ref 34.5–45)
HCT VFR BLD CALC: 31.7 % — LOW (ref 34.5–45)
HGB BLD-MCNC: 10 G/DL — LOW (ref 11.5–15.5)
HGB BLD-MCNC: 10 G/DL — LOW (ref 11.5–15.5)
IANC: 7.81 K/UL — HIGH (ref 1.8–7.4)
IANC: 7.81 K/UL — HIGH (ref 1.8–7.4)
IMM GRANULOCYTES NFR BLD AUTO: 0.4 % — SIGNIFICANT CHANGE UP (ref 0–0.9)
IMM GRANULOCYTES NFR BLD AUTO: 0.4 % — SIGNIFICANT CHANGE UP (ref 0–0.9)
LYMPHOCYTES # BLD AUTO: 1.47 K/UL — SIGNIFICANT CHANGE UP (ref 1–3.3)
LYMPHOCYTES # BLD AUTO: 1.47 K/UL — SIGNIFICANT CHANGE UP (ref 1–3.3)
LYMPHOCYTES # BLD AUTO: 13.9 % — SIGNIFICANT CHANGE UP (ref 13–44)
LYMPHOCYTES # BLD AUTO: 13.9 % — SIGNIFICANT CHANGE UP (ref 13–44)
MAGNESIUM SERPL-MCNC: 1.6 MG/DL — SIGNIFICANT CHANGE UP (ref 1.6–2.6)
MAGNESIUM SERPL-MCNC: 1.6 MG/DL — SIGNIFICANT CHANGE UP (ref 1.6–2.6)
MCHC RBC-ENTMCNC: 26 PG — LOW (ref 27–34)
MCHC RBC-ENTMCNC: 26 PG — LOW (ref 27–34)
MCHC RBC-ENTMCNC: 31.5 GM/DL — LOW (ref 32–36)
MCHC RBC-ENTMCNC: 31.5 GM/DL — LOW (ref 32–36)
MCV RBC AUTO: 82.3 FL — SIGNIFICANT CHANGE UP (ref 80–100)
MCV RBC AUTO: 82.3 FL — SIGNIFICANT CHANGE UP (ref 80–100)
MONOCYTES # BLD AUTO: 1.02 K/UL — HIGH (ref 0–0.9)
MONOCYTES # BLD AUTO: 1.02 K/UL — HIGH (ref 0–0.9)
MONOCYTES NFR BLD AUTO: 9.6 % — SIGNIFICANT CHANGE UP (ref 2–14)
MONOCYTES NFR BLD AUTO: 9.6 % — SIGNIFICANT CHANGE UP (ref 2–14)
NEUTROPHILS # BLD AUTO: 7.81 K/UL — HIGH (ref 1.8–7.4)
NEUTROPHILS # BLD AUTO: 7.81 K/UL — HIGH (ref 1.8–7.4)
NEUTROPHILS NFR BLD AUTO: 73.9 % — SIGNIFICANT CHANGE UP (ref 43–77)
NEUTROPHILS NFR BLD AUTO: 73.9 % — SIGNIFICANT CHANGE UP (ref 43–77)
NRBC # BLD: 0 /100 WBCS — SIGNIFICANT CHANGE UP (ref 0–0)
NRBC # BLD: 0 /100 WBCS — SIGNIFICANT CHANGE UP (ref 0–0)
NRBC # FLD: 0 K/UL — SIGNIFICANT CHANGE UP (ref 0–0)
NRBC # FLD: 0 K/UL — SIGNIFICANT CHANGE UP (ref 0–0)
PHOSPHATE SERPL-MCNC: 3.5 MG/DL — SIGNIFICANT CHANGE UP (ref 2.5–4.5)
PHOSPHATE SERPL-MCNC: 3.5 MG/DL — SIGNIFICANT CHANGE UP (ref 2.5–4.5)
PLATELET # BLD AUTO: 312 K/UL — SIGNIFICANT CHANGE UP (ref 150–400)
PLATELET # BLD AUTO: 312 K/UL — SIGNIFICANT CHANGE UP (ref 150–400)
POTASSIUM SERPL-MCNC: 4 MMOL/L — SIGNIFICANT CHANGE UP (ref 3.5–5.3)
POTASSIUM SERPL-MCNC: 4 MMOL/L — SIGNIFICANT CHANGE UP (ref 3.5–5.3)
POTASSIUM SERPL-SCNC: 4 MMOL/L — SIGNIFICANT CHANGE UP (ref 3.5–5.3)
POTASSIUM SERPL-SCNC: 4 MMOL/L — SIGNIFICANT CHANGE UP (ref 3.5–5.3)
RBC # BLD: 3.85 M/UL — SIGNIFICANT CHANGE UP (ref 3.8–5.2)
RBC # BLD: 3.85 M/UL — SIGNIFICANT CHANGE UP (ref 3.8–5.2)
RBC # FLD: 16.5 % — HIGH (ref 10.3–14.5)
RBC # FLD: 16.5 % — HIGH (ref 10.3–14.5)
RH IG SCN BLD-IMP: POSITIVE — SIGNIFICANT CHANGE UP
RH IG SCN BLD-IMP: POSITIVE — SIGNIFICANT CHANGE UP
SODIUM SERPL-SCNC: 132 MMOL/L — LOW (ref 135–145)
SODIUM SERPL-SCNC: 132 MMOL/L — LOW (ref 135–145)
WBC # BLD: 10.57 K/UL — HIGH (ref 3.8–10.5)
WBC # BLD: 10.57 K/UL — HIGH (ref 3.8–10.5)
WBC # FLD AUTO: 10.57 K/UL — HIGH (ref 3.8–10.5)
WBC # FLD AUTO: 10.57 K/UL — HIGH (ref 3.8–10.5)

## 2023-12-12 RX ORDER — METOCLOPRAMIDE HCL 10 MG
10 TABLET ORAL ONCE
Refills: 0 | Status: COMPLETED | OUTPATIENT
Start: 2023-12-12 | End: 2023-12-12

## 2023-12-12 RX ORDER — IBUPROFEN 200 MG
600 TABLET ORAL EVERY 6 HOURS
Refills: 0 | Status: DISCONTINUED | OUTPATIENT
Start: 2023-12-12 | End: 2023-12-14

## 2023-12-12 RX ORDER — OXYCODONE HYDROCHLORIDE 5 MG/1
5 TABLET ORAL
Refills: 0 | Status: DISCONTINUED | OUTPATIENT
Start: 2023-12-12 | End: 2023-12-14

## 2023-12-12 RX ORDER — FAMOTIDINE 10 MG/ML
20 INJECTION INTRAVENOUS
Refills: 0 | Status: DISCONTINUED | OUTPATIENT
Start: 2023-12-12 | End: 2023-12-14

## 2023-12-12 RX ORDER — MAGNESIUM OXIDE 400 MG ORAL TABLET 241.3 MG
400 TABLET ORAL ONCE
Refills: 0 | Status: COMPLETED | OUTPATIENT
Start: 2023-12-12 | End: 2023-12-12

## 2023-12-12 RX ORDER — ACETAMINOPHEN 500 MG
975 TABLET ORAL EVERY 6 HOURS
Refills: 0 | Status: DISCONTINUED | OUTPATIENT
Start: 2023-12-12 | End: 2023-12-14

## 2023-12-12 RX ADMIN — SODIUM CHLORIDE 3 MILLILITER(S): 9 INJECTION INTRAMUSCULAR; INTRAVENOUS; SUBCUTANEOUS at 06:18

## 2023-12-12 RX ADMIN — Medication 1000 MILLIGRAM(S): at 06:25

## 2023-12-12 RX ADMIN — HEPARIN SODIUM 5000 UNIT(S): 5000 INJECTION INTRAVENOUS; SUBCUTANEOUS at 09:38

## 2023-12-12 RX ADMIN — Medication 1 DROP(S): at 22:24

## 2023-12-12 RX ADMIN — ONDANSETRON 4 MILLIGRAM(S): 8 TABLET, FILM COATED ORAL at 16:48

## 2023-12-12 RX ADMIN — FAMOTIDINE 20 MILLIGRAM(S): 10 INJECTION INTRAVENOUS at 14:32

## 2023-12-12 RX ADMIN — SODIUM CHLORIDE 3 MILLILITER(S): 9 INJECTION INTRAMUSCULAR; INTRAVENOUS; SUBCUTANEOUS at 14:00

## 2023-12-12 RX ADMIN — CHLORHEXIDINE GLUCONATE 1 APPLICATION(S): 213 SOLUTION TOPICAL at 06:23

## 2023-12-12 RX ADMIN — Medication 88 MICROGRAM(S): at 05:55

## 2023-12-12 RX ADMIN — MAGNESIUM OXIDE 400 MG ORAL TABLET 400 MILLIGRAM(S): 241.3 TABLET ORAL at 12:31

## 2023-12-12 RX ADMIN — Medication 1 DROP(S): at 05:55

## 2023-12-12 RX ADMIN — Medication 10 MILLIGRAM(S): at 18:49

## 2023-12-12 RX ADMIN — Medication 30 MILLIGRAM(S): at 05:55

## 2023-12-12 RX ADMIN — HEPARIN SODIUM 5000 UNIT(S): 5000 INJECTION INTRAVENOUS; SUBCUTANEOUS at 00:28

## 2023-12-12 RX ADMIN — Medication 1 DROP(S): at 14:32

## 2023-12-12 RX ADMIN — ESCITALOPRAM OXALATE 20 MILLIGRAM(S): 10 TABLET, FILM COATED ORAL at 12:31

## 2023-12-12 RX ADMIN — HEPARIN SODIUM 5000 UNIT(S): 5000 INJECTION INTRAVENOUS; SUBCUTANEOUS at 18:02

## 2023-12-12 RX ADMIN — SODIUM CHLORIDE 3 MILLILITER(S): 9 INJECTION INTRAMUSCULAR; INTRAVENOUS; SUBCUTANEOUS at 22:14

## 2023-12-12 RX ADMIN — Medication 30 MILLIGRAM(S): at 06:25

## 2023-12-12 RX ADMIN — Medication 1000 MILLIGRAM(S): at 00:58

## 2023-12-12 RX ADMIN — SODIUM CHLORIDE 125 MILLILITER(S): 9 INJECTION, SOLUTION INTRAVENOUS at 22:24

## 2023-12-12 RX ADMIN — ONDANSETRON 4 MILLIGRAM(S): 8 TABLET, FILM COATED ORAL at 10:59

## 2023-12-12 RX ADMIN — SODIUM CHLORIDE 125 MILLILITER(S): 9 INJECTION, SOLUTION INTRAVENOUS at 18:02

## 2023-12-12 RX ADMIN — Medication 400 MILLIGRAM(S): at 05:55

## 2023-12-12 RX ADMIN — Medication 400 MILLIGRAM(S): at 00:28

## 2023-12-12 NOTE — PROGRESS NOTE ADULT - ASSESSMENT
Assessment/Plan: 78y POD#1 , s/p ex-lap DILIP, BSO, SNLMD for endometrial cancer (EBL: 100). Patient recovering well, postoperatively, with stable vital signs.  Decreased UOP overnight (although only recorded up to 2am); however, patient hemodynamically stable, with stable vital signs.     Neuro: IV Tylenol, Toradol, Oxy PRN. Plan to transition to PO pain meds as tolerated  - c/w home escitalopram  CV: Hemodynamically stable, Lopressor PRN for HTN  - f/u AM CBC  Pulm: Saturating well on RA. Increase incentive spirometry.  GI: Reg diet  - Senna, simethicone, zofran PRN  : Anguiano in place, (UOP: 175cc overnight), pending AM labs plan to remove anguiano today  - vaginal spotting appears appropriate, will continue to monitor for bleeding  Heme: HSQ and SCDs  - Pt will need home AC ppx x28 days for presumed hypercoagulable state; will send home w/ Abixaban 2.5mg BID  FEN: LR@125, replete electrolytes PRN  ID: Afebrile  Endo: c/w home synthroid 88mcg  Dispo: continue inpatient care      Seen with GynOnc team  Sridevi Gr, PGY2 Assessment/Plan: 78y POD#1 , s/p ex-lap DILIP, BSO, SNLMD for endometrial cancer (EBL: 100). Patient recovering well postoperatively, with stable vital signs.       Neuro: IV Tylenol, Toradol, Oxy PRN. Plan to transition to PO pain meds as tolerated  - c/w home escitalopram  CV: Hemodynamically stable, Lopressor PRN for HTN  - f/u AM CBC  Pulm: Saturating well on RA. Increase incentive spirometry.  GI: Reg diet  - Senna, simethicone, zofran PRN  : Anguiano in place, (UOP: 475cc overnight), pending AM labs plan to remove anguiano today  - vaginal spotting appears appropriate, will continue to monitor for bleeding  Heme: HSQ and SCDs  - Pt will need home AC ppx x28 days for presumed hypercoagulable state; will send home w/ Abixaban 2.5mg BID  FEN: LR@125, replete electrolytes PRN  ID: Afebrile  Endo: c/w home synthroid 88mcg  Dispo: continue inpatient care      Seen with GynOnc team  Sridevi Gr, PGY2

## 2023-12-12 NOTE — CHART NOTE - NSCHARTNOTEFT_GEN_A_CORE
At discharge patient will be going home with AC extended ppx x 28days for presumed hypercoagulable state.  Apixaban picked up from [facility] pharmacy and given to patient at bedside. Medication instructions reviewed.

## 2023-12-12 NOTE — PROGRESS NOTE ADULT - SUBJECTIVE AND OBJECTIVE BOX
Gyn ONC Progress Note POD#1  HD#2    Subjective:   Pt seen and examined at bedside. No events overnight.   Pain well controlled.   Patient reports she did not have anything to eat overnight since the completion of her surgery due to no appetite. Denies any nausea or vomiting.   No passing of flatus.  Pt denies fever, chills, chest pain, SOB, lightheadedness, dizziness.        Objective:  T(F): 98.5 (12-11-23 @ 22:05), Max: 98.5 (12-11-23 @ 22:05)  HR: 63 (12-11-23 @ 22:05) (59 - 70)  BP: 164/63 (12-11-23 @ 22:05) (126/75 - 164/63)  RR: 18 (12-11-23 @ 22:05) (11 - 20)  SpO2: 100% (12-11-23 @ 22:05) (94% - 100%)  Wt(kg): --  I&O's Summary    11 Dec 2023 07:01  -  12 Dec 2023 06:27  --------------------------------------------------------  IN: 2025 mL / OUT: 535 mL / NET: 1490 mL      CAPILLARY BLOOD GLUCOSE      POCT Blood Glucose.: 98 mg/dL (11 Dec 2023 08:21)      MEDICATIONS  (STANDING):  acetaminophen   IVPB .. 1000 milliGRAM(s) IV Intermittent every 6 hours  artificial tears (preservative free) Ophthalmic Solution 1 Drop(s) Both EYES every 8 hours  chlorhexidine 2% Cloths 1 Application(s) Topical every 12 hours  escitalopram 20 milliGRAM(s) Oral daily  heparin   Injectable 5000 Unit(s) SubCutaneous every 8 hours  lactated ringers. 1000 milliLiter(s) (30 mL/Hr) IV Continuous <Continuous>  lactated ringers. 1000 milliLiter(s) (125 mL/Hr) IV Continuous <Continuous>  levothyroxine 88 MICROGram(s) Oral daily  sodium chloride 0.9% lock flush 3 milliLiter(s) IV Push every 8 hours    MEDICATIONS  (PRN):  metoprolol tartrate Injectable 5 milliGRAM(s) IV Push every 6 hours PRN systolic >160 or diastolic >110  ondansetron Injectable 4 milliGRAM(s) IV Push every 6 hours PRN Nausea and/or Vomiting  oxyCODONE    IR 5 milliGRAM(s) Oral every 3 hours PRN Moderate Pain (4 - 6)  oxyCODONE    IR 10 milliGRAM(s) Oral every 4 hours PRN Severe Pain (7 - 10)  senna 1 Tablet(s) Oral at bedtime PRN Constipation  simethicone 80 milliGRAM(s) Chew every 6 hours PRN Gas      Physical Exam:  Constitutional: NAD  CV: normal S1, S2  Lungs: CTA b/l   Abdomen: soft, nondistended, appropriately tender, +BS  Incision: midline vertical incision with Dermabond Prineo overlying, dressing is c/d/i  : Scant brown spotting seen on pad, no active bleeding noted, Ley catheter in place with clear, yellow urine  Extremities: SCDs in place, no posterior calf tenderness bilaterally      LABS:

## 2023-12-12 NOTE — PROGRESS NOTE ADULT - ATTENDING COMMENTS
patient feeling a bit nauseous  will hold reg diet - otherwise soft belly + flatus  eat as tolerating  labs stable  oob, voiding  lovenox/scd's

## 2023-12-12 NOTE — CHART NOTE - NSCHARTNOTEFT_GEN_A_CORE
Patient seen and evaluated.   Findings at surgery reviewed.   Overall doing well.   Still feeling quite nauseous.   Continue supportive care.   Agree with housestaff assessment today.

## 2023-12-13 LAB
ANION GAP SERPL CALC-SCNC: 10 MMOL/L — SIGNIFICANT CHANGE UP (ref 7–14)
ANION GAP SERPL CALC-SCNC: 10 MMOL/L — SIGNIFICANT CHANGE UP (ref 7–14)
BASOPHILS # BLD AUTO: 0.05 K/UL — SIGNIFICANT CHANGE UP (ref 0–0.2)
BASOPHILS # BLD AUTO: 0.05 K/UL — SIGNIFICANT CHANGE UP (ref 0–0.2)
BASOPHILS NFR BLD AUTO: 0.3 % — SIGNIFICANT CHANGE UP (ref 0–2)
BASOPHILS NFR BLD AUTO: 0.3 % — SIGNIFICANT CHANGE UP (ref 0–2)
BUN SERPL-MCNC: 4 MG/DL — LOW (ref 7–23)
BUN SERPL-MCNC: 4 MG/DL — LOW (ref 7–23)
CALCIUM SERPL-MCNC: 8.3 MG/DL — LOW (ref 8.4–10.5)
CALCIUM SERPL-MCNC: 8.3 MG/DL — LOW (ref 8.4–10.5)
CHLORIDE SERPL-SCNC: 98 MMOL/L — SIGNIFICANT CHANGE UP (ref 98–107)
CHLORIDE SERPL-SCNC: 98 MMOL/L — SIGNIFICANT CHANGE UP (ref 98–107)
CO2 SERPL-SCNC: 27 MMOL/L — SIGNIFICANT CHANGE UP (ref 22–31)
CO2 SERPL-SCNC: 27 MMOL/L — SIGNIFICANT CHANGE UP (ref 22–31)
CREAT SERPL-MCNC: 0.48 MG/DL — LOW (ref 0.5–1.3)
CREAT SERPL-MCNC: 0.48 MG/DL — LOW (ref 0.5–1.3)
EGFR: 97 ML/MIN/1.73M2 — SIGNIFICANT CHANGE UP
EGFR: 97 ML/MIN/1.73M2 — SIGNIFICANT CHANGE UP
EOSINOPHIL # BLD AUTO: 0.09 K/UL — SIGNIFICANT CHANGE UP (ref 0–0.5)
EOSINOPHIL # BLD AUTO: 0.09 K/UL — SIGNIFICANT CHANGE UP (ref 0–0.5)
EOSINOPHIL NFR BLD AUTO: 0.6 % — SIGNIFICANT CHANGE UP (ref 0–6)
EOSINOPHIL NFR BLD AUTO: 0.6 % — SIGNIFICANT CHANGE UP (ref 0–6)
GLUCOSE SERPL-MCNC: 103 MG/DL — HIGH (ref 70–99)
GLUCOSE SERPL-MCNC: 103 MG/DL — HIGH (ref 70–99)
HCT VFR BLD CALC: 33 % — LOW (ref 34.5–45)
HCT VFR BLD CALC: 33 % — LOW (ref 34.5–45)
HGB BLD-MCNC: 10.5 G/DL — LOW (ref 11.5–15.5)
HGB BLD-MCNC: 10.5 G/DL — LOW (ref 11.5–15.5)
IANC: 12.01 K/UL — HIGH (ref 1.8–7.4)
IANC: 12.01 K/UL — HIGH (ref 1.8–7.4)
IMM GRANULOCYTES NFR BLD AUTO: 0.5 % — SIGNIFICANT CHANGE UP (ref 0–0.9)
IMM GRANULOCYTES NFR BLD AUTO: 0.5 % — SIGNIFICANT CHANGE UP (ref 0–0.9)
LYMPHOCYTES # BLD AUTO: 1.32 K/UL — SIGNIFICANT CHANGE UP (ref 1–3.3)
LYMPHOCYTES # BLD AUTO: 1.32 K/UL — SIGNIFICANT CHANGE UP (ref 1–3.3)
LYMPHOCYTES # BLD AUTO: 9.1 % — LOW (ref 13–44)
LYMPHOCYTES # BLD AUTO: 9.1 % — LOW (ref 13–44)
MAGNESIUM SERPL-MCNC: 1.5 MG/DL — LOW (ref 1.6–2.6)
MAGNESIUM SERPL-MCNC: 1.5 MG/DL — LOW (ref 1.6–2.6)
MCHC RBC-ENTMCNC: 25.6 PG — LOW (ref 27–34)
MCHC RBC-ENTMCNC: 25.6 PG — LOW (ref 27–34)
MCHC RBC-ENTMCNC: 31.8 GM/DL — LOW (ref 32–36)
MCHC RBC-ENTMCNC: 31.8 GM/DL — LOW (ref 32–36)
MCV RBC AUTO: 80.5 FL — SIGNIFICANT CHANGE UP (ref 80–100)
MCV RBC AUTO: 80.5 FL — SIGNIFICANT CHANGE UP (ref 80–100)
MONOCYTES # BLD AUTO: 1 K/UL — HIGH (ref 0–0.9)
MONOCYTES # BLD AUTO: 1 K/UL — HIGH (ref 0–0.9)
MONOCYTES NFR BLD AUTO: 6.9 % — SIGNIFICANT CHANGE UP (ref 2–14)
MONOCYTES NFR BLD AUTO: 6.9 % — SIGNIFICANT CHANGE UP (ref 2–14)
NEUTROPHILS # BLD AUTO: 12.01 K/UL — HIGH (ref 1.8–7.4)
NEUTROPHILS # BLD AUTO: 12.01 K/UL — HIGH (ref 1.8–7.4)
NEUTROPHILS NFR BLD AUTO: 82.6 % — HIGH (ref 43–77)
NEUTROPHILS NFR BLD AUTO: 82.6 % — HIGH (ref 43–77)
NRBC # BLD: 0 /100 WBCS — SIGNIFICANT CHANGE UP (ref 0–0)
NRBC # BLD: 0 /100 WBCS — SIGNIFICANT CHANGE UP (ref 0–0)
NRBC # FLD: 0 K/UL — SIGNIFICANT CHANGE UP (ref 0–0)
NRBC # FLD: 0 K/UL — SIGNIFICANT CHANGE UP (ref 0–0)
PHOSPHATE SERPL-MCNC: 3.1 MG/DL — SIGNIFICANT CHANGE UP (ref 2.5–4.5)
PHOSPHATE SERPL-MCNC: 3.1 MG/DL — SIGNIFICANT CHANGE UP (ref 2.5–4.5)
PLATELET # BLD AUTO: 315 K/UL — SIGNIFICANT CHANGE UP (ref 150–400)
PLATELET # BLD AUTO: 315 K/UL — SIGNIFICANT CHANGE UP (ref 150–400)
POTASSIUM SERPL-MCNC: 3.6 MMOL/L — SIGNIFICANT CHANGE UP (ref 3.5–5.3)
POTASSIUM SERPL-MCNC: 3.6 MMOL/L — SIGNIFICANT CHANGE UP (ref 3.5–5.3)
POTASSIUM SERPL-SCNC: 3.6 MMOL/L — SIGNIFICANT CHANGE UP (ref 3.5–5.3)
POTASSIUM SERPL-SCNC: 3.6 MMOL/L — SIGNIFICANT CHANGE UP (ref 3.5–5.3)
RBC # BLD: 4.1 M/UL — SIGNIFICANT CHANGE UP (ref 3.8–5.2)
RBC # BLD: 4.1 M/UL — SIGNIFICANT CHANGE UP (ref 3.8–5.2)
RBC # FLD: 16.1 % — HIGH (ref 10.3–14.5)
RBC # FLD: 16.1 % — HIGH (ref 10.3–14.5)
SODIUM SERPL-SCNC: 135 MMOL/L — SIGNIFICANT CHANGE UP (ref 135–145)
SODIUM SERPL-SCNC: 135 MMOL/L — SIGNIFICANT CHANGE UP (ref 135–145)
WBC # BLD: 14.55 K/UL — HIGH (ref 3.8–10.5)
WBC # BLD: 14.55 K/UL — HIGH (ref 3.8–10.5)
WBC # FLD AUTO: 14.55 K/UL — HIGH (ref 3.8–10.5)
WBC # FLD AUTO: 14.55 K/UL — HIGH (ref 3.8–10.5)

## 2023-12-13 RX ORDER — MAGNESIUM SULFATE 500 MG/ML
2 VIAL (ML) INJECTION ONCE
Refills: 0 | Status: COMPLETED | OUTPATIENT
Start: 2023-12-13 | End: 2023-12-13

## 2023-12-13 RX ORDER — PROPRANOLOL HCL 160 MG
60 CAPSULE, EXTENDED RELEASE 24HR ORAL DAILY
Refills: 0 | Status: DISCONTINUED | OUTPATIENT
Start: 2023-12-13 | End: 2023-12-14

## 2023-12-13 RX ORDER — CITRIC ACID/SODIUM CITRATE 300-500 MG
30 SOLUTION, ORAL ORAL ONCE
Refills: 0 | Status: COMPLETED | OUTPATIENT
Start: 2023-12-13 | End: 2023-12-13

## 2023-12-13 RX ADMIN — Medication 1 DROP(S): at 14:09

## 2023-12-13 RX ADMIN — SODIUM CHLORIDE 3 MILLILITER(S): 9 INJECTION INTRAMUSCULAR; INTRAVENOUS; SUBCUTANEOUS at 22:59

## 2023-12-13 RX ADMIN — HEPARIN SODIUM 5000 UNIT(S): 5000 INJECTION INTRAVENOUS; SUBCUTANEOUS at 01:53

## 2023-12-13 RX ADMIN — Medication 60 MILLIGRAM(S): at 11:42

## 2023-12-13 RX ADMIN — HEPARIN SODIUM 5000 UNIT(S): 5000 INJECTION INTRAVENOUS; SUBCUTANEOUS at 10:17

## 2023-12-13 RX ADMIN — ESCITALOPRAM OXALATE 20 MILLIGRAM(S): 10 TABLET, FILM COATED ORAL at 11:42

## 2023-12-13 RX ADMIN — Medication 30 MILLILITER(S): at 14:35

## 2023-12-13 RX ADMIN — FAMOTIDINE 20 MILLIGRAM(S): 10 INJECTION INTRAVENOUS at 17:54

## 2023-12-13 RX ADMIN — HEPARIN SODIUM 5000 UNIT(S): 5000 INJECTION INTRAVENOUS; SUBCUTANEOUS at 17:54

## 2023-12-13 RX ADMIN — Medication 1 DROP(S): at 06:38

## 2023-12-13 RX ADMIN — ONDANSETRON 4 MILLIGRAM(S): 8 TABLET, FILM COATED ORAL at 02:00

## 2023-12-13 RX ADMIN — FAMOTIDINE 20 MILLIGRAM(S): 10 INJECTION INTRAVENOUS at 06:42

## 2023-12-13 RX ADMIN — Medication 25 GRAM(S): at 10:12

## 2023-12-13 RX ADMIN — Medication 88 MICROGRAM(S): at 06:37

## 2023-12-13 RX ADMIN — SODIUM CHLORIDE 3 MILLILITER(S): 9 INJECTION INTRAMUSCULAR; INTRAVENOUS; SUBCUTANEOUS at 06:47

## 2023-12-13 RX ADMIN — SODIUM CHLORIDE 3 MILLILITER(S): 9 INJECTION INTRAMUSCULAR; INTRAVENOUS; SUBCUTANEOUS at 14:16

## 2023-12-13 RX ADMIN — ONDANSETRON 4 MILLIGRAM(S): 8 TABLET, FILM COATED ORAL at 10:12

## 2023-12-13 NOTE — PROGRESS NOTE ADULT - SUBJECTIVE AND OBJECTIVE BOX
Gyn ONC Progress Note POD#2  HD#3    Subjective:   Pt seen and examined at bedside. No events overnight. Pain well controlled. Patient ambulating. Voiding independently. Passing flatus. Patient with nausea and dry heaving not yet tolerating clears. Pt denies fever, chills, chest pain, SOB, lightheadedness, dizziness.      Objective:  T(F): 97.9 (12-13-23 @ 06:00), Max: 98.8 (12-12-23 @ 17:25)  HR: 72 (12-13-23 @ 06:00) (67 - 80)  BP: 154/59 (12-13-23 @ 06:00) (146/63 - 156/56)  RR: 16 (12-13-23 @ 06:00) (16 - 18)  SpO2: 99% (12-13-23 @ 06:00) (97% - 99%)  Wt(kg): --  I&O's Summary    11 Dec 2023 07:01  -  12 Dec 2023 07:00  --------------------------------------------------------  IN: 2725 mL / OUT: 985 mL / NET: 1740 mL    12 Dec 2023 07:01  -  13 Dec 2023 06:58  --------------------------------------------------------  IN: 3360 mL / OUT: 3950 mL / NET: -590 mL      CAPILLARY BLOOD GLUCOSE          MEDICATIONS  (STANDING):  acetaminophen     Tablet .. 975 milliGRAM(s) Oral every 6 hours  artificial tears (preservative free) Ophthalmic Solution 1 Drop(s) Both EYES every 8 hours  chlorhexidine 2% Cloths 1 Application(s) Topical every 12 hours  escitalopram 20 milliGRAM(s) Oral daily  famotidine    Tablet 20 milliGRAM(s) Oral two times a day  heparin   Injectable 5000 Unit(s) SubCutaneous every 8 hours  ibuprofen  Tablet. 600 milliGRAM(s) Oral every 6 hours  levothyroxine 88 MICROGram(s) Oral daily  sodium chloride 0.9% lock flush 3 milliLiter(s) IV Push every 8 hours    MEDICATIONS  (PRN):  metoprolol tartrate Injectable 5 milliGRAM(s) IV Push every 6 hours PRN systolic >160 or diastolic >110  ondansetron Injectable 4 milliGRAM(s) IV Push every 6 hours PRN Nausea and/or Vomiting  oxyCODONE    IR 5 milliGRAM(s) Oral every 3 hours PRN Moderate to Severe  Pain (4 - 10)  senna 1 Tablet(s) Oral at bedtime PRN Constipation  simethicone 80 milliGRAM(s) Chew every 6 hours PRN Gas      Physical Exam:  Constitutional: NAD  CV: normal S1, S2  Lungs: CTA b/l   Abdomen: soft, nondistended, appropriately tender, abdominal binder in place   Incision: midline vertical incision with Dermabond Prineo overlying, dressing is c/d/i  : Ley catheter in place with clear, yellow urine  Extremities: no calf tenderness or swelling, b/l SCDs in place    LABS:  12-12    132<L>  |  99     |  6<L>   ----------------------------<  94     4.0     |  24     |  0.57     Ca    7.8<L>      12 Dec 2023 05:45  Phos  3.5       12-12  Mg     1.60      12-12            Urinalysis Basic - ( 12 Dec 2023 05:45 )    Color: x / Appearance: x / SG: x / pH: x  Gluc: 94 mg/dL / Ketone: x  / Bili: x / Urobili: x   Blood: x / Protein: x / Nitrite: x   Leuk Esterase: x / RBC: x / WBC x   Sq Epi: x / Non Sq Epi: x / Bacteria: x       Gyn ONC Progress Note POD#2  HD#3    Subjective:   Pt seen and examined at bedside. No events overnight. Pain well controlled. Patient ambulating. Voiding independently. Passing flatus. Patient with nausea and dry heaving yesterday, somewhat improved this AM. Having sips of clears but no solid food. Pt denies fever, chills, chest pain, SOB, lightheadedness, dizziness.      Objective:  T(F): 97.9 (12-13-23 @ 06:00), Max: 98.8 (12-12-23 @ 17:25)  HR: 72 (12-13-23 @ 06:00) (67 - 80)  BP: 154/59 (12-13-23 @ 06:00) (146/63 - 156/56)  RR: 16 (12-13-23 @ 06:00) (16 - 18)  SpO2: 99% (12-13-23 @ 06:00) (97% - 99%)  Wt(kg): --  I&O's Summary    11 Dec 2023 07:01  -  12 Dec 2023 07:00  --------------------------------------------------------  IN: 2725 mL / OUT: 985 mL / NET: 1740 mL    12 Dec 2023 07:01  -  13 Dec 2023 06:58  --------------------------------------------------------  IN: 3360 mL / OUT: 3950 mL / NET: -590 mL      CAPILLARY BLOOD GLUCOSE          MEDICATIONS  (STANDING):  acetaminophen     Tablet .. 975 milliGRAM(s) Oral every 6 hours  artificial tears (preservative free) Ophthalmic Solution 1 Drop(s) Both EYES every 8 hours  chlorhexidine 2% Cloths 1 Application(s) Topical every 12 hours  escitalopram 20 milliGRAM(s) Oral daily  famotidine    Tablet 20 milliGRAM(s) Oral two times a day  heparin   Injectable 5000 Unit(s) SubCutaneous every 8 hours  ibuprofen  Tablet. 600 milliGRAM(s) Oral every 6 hours  levothyroxine 88 MICROGram(s) Oral daily  sodium chloride 0.9% lock flush 3 milliLiter(s) IV Push every 8 hours    MEDICATIONS  (PRN):  metoprolol tartrate Injectable 5 milliGRAM(s) IV Push every 6 hours PRN systolic >160 or diastolic >110  ondansetron Injectable 4 milliGRAM(s) IV Push every 6 hours PRN Nausea and/or Vomiting  oxyCODONE    IR 5 milliGRAM(s) Oral every 3 hours PRN Moderate to Severe  Pain (4 - 10)  senna 1 Tablet(s) Oral at bedtime PRN Constipation  simethicone 80 milliGRAM(s) Chew every 6 hours PRN Gas      Physical Exam:  Constitutional: NAD  CV: normal S1, S2  Lungs: CTA b/l   Abdomen: soft, nondistended, appropriately tender, abdominal binder in place   Incision: midline vertical incision with Dermabond Prineo overlying, dressing is c/d/i  : Ley catheter in place with clear, yellow urine  Extremities: no calf tenderness or swelling, b/l SCDs in place    LABS:  12-12    132<L>  |  99     |  6<L>   ----------------------------<  94     4.0     |  24     |  0.57     Ca    7.8<L>      12 Dec 2023 05:45  Phos  3.5       12-12  Mg     1.60      12-12            Urinalysis Basic - ( 12 Dec 2023 05:45 )    Color: x / Appearance: x / SG: x / pH: x  Gluc: 94 mg/dL / Ketone: x  / Bili: x / Urobili: x   Blood: x / Protein: x / Nitrite: x   Leuk Esterase: x / RBC: x / WBC x   Sq Epi: x / Non Sq Epi: x / Bacteria: x

## 2023-12-13 NOTE — PROGRESS NOTE ADULT - ATTENDING COMMENTS
77 yo POD#2, s/p ex-lap DILIP, BSO, SNLMD for endometrial cancer (EBL: 100). Patient experiencing some nausea/vomiting, intolerance of PO on POD#1. Currently advancing diet slowly and monitoring bowel function. No current emesis and only nausea with minimal PO intake.    -AVSS, labs reviewed. Afebrile, appropriate postop leukocytosis. Replete lytes as indicated.  -Hct stable and on SQH ppx. To provide extended ppx upon discharge.  -Pain well controlled with current pain medication regimen.  -Currently advancing diet slowly and monitoring bowel function. No current emesis and only nausea with minimal PO intake, passing  flatus. Abdominal binder in situ. Soft, appropriately tender abdomen.  -Ambulation and IS encouraged.  -Plan for continued inpatient management for postoperative care.   -All questions answered to patients satisfaction.    Paola Conklin MD      Division of Gynecologic Oncology    Department of Obstetrics and Gynecology  [facility]    [facility] 77 yo POD#2, s/p ex-lap DILIP, BSO, SNLMD for endometrial cancer (EBL: 100). Patient experiencing some nausea/vomiting, intolerance of PO on POD#1. Currently advancing diet slowly and monitoring bowel function. No current emesis and only nausea with minimal PO intake.    -AVSS, labs reviewed. Afebrile, appropriate postop leukocytosis. Replete lytes as indicated.  -Hct stable and on SQH ppx. To provide extended ppx upon discharge.  -Pain well controlled with current pain medication regimen.  -Currently advancing diet slowly and monitoring bowel function. No current emesis and only nausea with minimal PO intake, passing  flatus. Abdominal binder in situ. Soft, appropriately tender abdomen.  -Ambulation and IS encouraged.  -Plan for continued inpatient management for postoperative care.   -All questions answered to patients satisfaction.    Paola Conlkin MD      Division of Gynecologic Oncology    Department of Obstetrics and Gynecology  [facility]    [facility]

## 2023-12-13 NOTE — PROGRESS NOTE ADULT - ASSESSMENT
Assessment/Plan: 78y POD#2, s/p ex-lap DILIP, BSO, SNLMD for endometrial cancer (EBL: 100). Patient recovering well postoperatively, with stable vital signs.     Neuro: PO Tylenol, Motrin, Oxy PRN.   - c/w home Escitalopram  CV: Hemodynamically stable, Lopressor PRN for HTN.  - H/H: 11.2/36.2->10.0/31.7->10.5/33.0; stable   Pulm: Saturating well on RA. Increase incentive spirometry.  GI: Patient continues to have nausea, advance diet as tolerated and continue to monitor closely.   - Senna, simethicone, zofran PRN  : Voiding independently, adequate UOP.   Heme: HSQ and SCDs  - Apixaban at bedside   FEN: LR@125, replete electrolytes PRN.   ID: Afebrile  Endo: c/w home Synthroid 88mcg  Dispo: Continue routine postop care    Norbert Molina, PGY-1 Assessment/Plan: 78y POD#2, s/p ex-lap DILIP, BSO, SNLMD for endometrial cancer (EBL: 100). Patient experiencing some nausea/vomiting, intolerance of PO on POD#1. Currently advancing diet slowly and monitoring bowel function. Patient otherwise recovering well postoperatively, with stable vital signs.     Neuro: PO Tylenol, Motrin, Oxy PRN.   Psych: c/w home Escitalopram  CV: Hemodynamically stable, Lopressor PRN for HTN.  - H/H: 11.2/36.2->10.0/31.7->10.5/33.0; stable   Pulm: Saturating well on RA. Increase incentive spirometry.  GI: Patient continues to have nausea, self limiting to clears. Advance diet as tolerated and continue to monitor closely.   - Senna, simethicone, zofran PRN  : Voiding independently, adequate UOP.   Heme: HSQ and SCDs  - will d/c with Apixaban x28d for presumed hypercoagulable state   FEN: SLIV, replete electrolytes PRN.   ID: Afebrile  Endo: c/w home Synthroid 88mcg  Dispo: Continue routine postop care    Norbert Molina, PGY-1  Beba Hester MD PGY4

## 2023-12-13 NOTE — PROVIDER CONTACT NOTE (OTHER) - ASSESSMENT
Patient axox4. Midline Midline incision dry and intact with abdominal binder in place dry and intact. No c/o headache or any discomfort. OOB and ambulating to the bathroom.

## 2023-12-14 ENCOUNTER — TRANSCRIPTION ENCOUNTER (OUTPATIENT)
Age: 78
End: 2023-12-14

## 2023-12-14 VITALS
HEART RATE: 78 BPM | RESPIRATION RATE: 18 BRPM | TEMPERATURE: 98 F | SYSTOLIC BLOOD PRESSURE: 139 MMHG | OXYGEN SATURATION: 98 % | DIASTOLIC BLOOD PRESSURE: 62 MMHG

## 2023-12-14 LAB
ANION GAP SERPL CALC-SCNC: 10 MMOL/L — SIGNIFICANT CHANGE UP (ref 7–14)
ANION GAP SERPL CALC-SCNC: 10 MMOL/L — SIGNIFICANT CHANGE UP (ref 7–14)
BUN SERPL-MCNC: 3 MG/DL — LOW (ref 7–23)
BUN SERPL-MCNC: 3 MG/DL — LOW (ref 7–23)
CALCIUM SERPL-MCNC: 8.1 MG/DL — LOW (ref 8.4–10.5)
CALCIUM SERPL-MCNC: 8.1 MG/DL — LOW (ref 8.4–10.5)
CHLORIDE SERPL-SCNC: 98 MMOL/L — SIGNIFICANT CHANGE UP (ref 98–107)
CHLORIDE SERPL-SCNC: 98 MMOL/L — SIGNIFICANT CHANGE UP (ref 98–107)
CO2 SERPL-SCNC: 30 MMOL/L — SIGNIFICANT CHANGE UP (ref 22–31)
CO2 SERPL-SCNC: 30 MMOL/L — SIGNIFICANT CHANGE UP (ref 22–31)
CREAT SERPL-MCNC: 0.53 MG/DL — SIGNIFICANT CHANGE UP (ref 0.5–1.3)
CREAT SERPL-MCNC: 0.53 MG/DL — SIGNIFICANT CHANGE UP (ref 0.5–1.3)
EGFR: 95 ML/MIN/1.73M2 — SIGNIFICANT CHANGE UP
EGFR: 95 ML/MIN/1.73M2 — SIGNIFICANT CHANGE UP
GLUCOSE SERPL-MCNC: 96 MG/DL — SIGNIFICANT CHANGE UP (ref 70–99)
GLUCOSE SERPL-MCNC: 96 MG/DL — SIGNIFICANT CHANGE UP (ref 70–99)
HCT VFR BLD CALC: 29.4 % — LOW (ref 34.5–45)
HCT VFR BLD CALC: 29.4 % — LOW (ref 34.5–45)
HGB BLD-MCNC: 9.4 G/DL — LOW (ref 11.5–15.5)
HGB BLD-MCNC: 9.4 G/DL — LOW (ref 11.5–15.5)
MAGNESIUM SERPL-MCNC: 2 MG/DL — SIGNIFICANT CHANGE UP (ref 1.6–2.6)
MAGNESIUM SERPL-MCNC: 2 MG/DL — SIGNIFICANT CHANGE UP (ref 1.6–2.6)
MCHC RBC-ENTMCNC: 26 PG — LOW (ref 27–34)
MCHC RBC-ENTMCNC: 26 PG — LOW (ref 27–34)
MCHC RBC-ENTMCNC: 32 GM/DL — SIGNIFICANT CHANGE UP (ref 32–36)
MCHC RBC-ENTMCNC: 32 GM/DL — SIGNIFICANT CHANGE UP (ref 32–36)
MCV RBC AUTO: 81.2 FL — SIGNIFICANT CHANGE UP (ref 80–100)
MCV RBC AUTO: 81.2 FL — SIGNIFICANT CHANGE UP (ref 80–100)
NON-GYNECOLOGICAL CYTOLOGY STUDY: SIGNIFICANT CHANGE UP
NON-GYNECOLOGICAL CYTOLOGY STUDY: SIGNIFICANT CHANGE UP
NRBC # BLD: 0 /100 WBCS — SIGNIFICANT CHANGE UP (ref 0–0)
NRBC # BLD: 0 /100 WBCS — SIGNIFICANT CHANGE UP (ref 0–0)
NRBC # FLD: 0 K/UL — SIGNIFICANT CHANGE UP (ref 0–0)
NRBC # FLD: 0 K/UL — SIGNIFICANT CHANGE UP (ref 0–0)
PHOSPHATE SERPL-MCNC: 3.1 MG/DL — SIGNIFICANT CHANGE UP (ref 2.5–4.5)
PHOSPHATE SERPL-MCNC: 3.1 MG/DL — SIGNIFICANT CHANGE UP (ref 2.5–4.5)
PLATELET # BLD AUTO: 303 K/UL — SIGNIFICANT CHANGE UP (ref 150–400)
PLATELET # BLD AUTO: 303 K/UL — SIGNIFICANT CHANGE UP (ref 150–400)
POTASSIUM SERPL-MCNC: 3.5 MMOL/L — SIGNIFICANT CHANGE UP (ref 3.5–5.3)
POTASSIUM SERPL-MCNC: 3.5 MMOL/L — SIGNIFICANT CHANGE UP (ref 3.5–5.3)
POTASSIUM SERPL-SCNC: 3.5 MMOL/L — SIGNIFICANT CHANGE UP (ref 3.5–5.3)
POTASSIUM SERPL-SCNC: 3.5 MMOL/L — SIGNIFICANT CHANGE UP (ref 3.5–5.3)
RBC # BLD: 3.62 M/UL — LOW (ref 3.8–5.2)
RBC # BLD: 3.62 M/UL — LOW (ref 3.8–5.2)
RBC # FLD: 16.5 % — HIGH (ref 10.3–14.5)
RBC # FLD: 16.5 % — HIGH (ref 10.3–14.5)
SODIUM SERPL-SCNC: 138 MMOL/L — SIGNIFICANT CHANGE UP (ref 135–145)
SODIUM SERPL-SCNC: 138 MMOL/L — SIGNIFICANT CHANGE UP (ref 135–145)
WBC # BLD: 9.72 K/UL — SIGNIFICANT CHANGE UP (ref 3.8–10.5)
WBC # BLD: 9.72 K/UL — SIGNIFICANT CHANGE UP (ref 3.8–10.5)
WBC # FLD AUTO: 9.72 K/UL — SIGNIFICANT CHANGE UP (ref 3.8–10.5)
WBC # FLD AUTO: 9.72 K/UL — SIGNIFICANT CHANGE UP (ref 3.8–10.5)

## 2023-12-14 RX ORDER — OXYCODONE HYDROCHLORIDE 5 MG/1
1 TABLET ORAL
Qty: 8 | Refills: 0
Start: 2023-12-14

## 2023-12-14 RX ADMIN — SODIUM CHLORIDE 3 MILLILITER(S): 9 INJECTION INTRAMUSCULAR; INTRAVENOUS; SUBCUTANEOUS at 05:49

## 2023-12-14 RX ADMIN — HEPARIN SODIUM 5000 UNIT(S): 5000 INJECTION INTRAVENOUS; SUBCUTANEOUS at 02:21

## 2023-12-14 RX ADMIN — Medication 88 MICROGRAM(S): at 05:55

## 2023-12-14 RX ADMIN — Medication 60 MILLIGRAM(S): at 05:57

## 2023-12-14 RX ADMIN — FAMOTIDINE 20 MILLIGRAM(S): 10 INJECTION INTRAVENOUS at 05:55

## 2023-12-14 NOTE — DISCHARGE NOTE NURSING/CASE MANAGEMENT/SOCIAL WORK - PATIENT PORTAL LINK FT
You can access the FollowMyHealth Patient Portal offered by [facility] by registering at the following website: [email or web address]. By joining [facility]’s FollowMyHealth portal, you will also be able to view your health information using other applications (apps) compatible with our system. You can access the FollowMyHealth Patient Portal offered by [facility] by registering at the following website: [email or web address]. By joining [facility]’s FollowMyHealth portal, you will also be able to view your health information using other applications (apps) compatible with our system.

## 2023-12-14 NOTE — PROGRESS NOTE ADULT - ATTENDING COMMENTS
Patient seen and examined, agree with gyn onc fellow and resident  POD#3  Doing well  Routine postop care  Exam benign  Plan d/c home

## 2023-12-14 NOTE — DISCHARGE NOTE NURSING/CASE MANAGEMENT/SOCIAL WORK - NSDCPEFALRISK_GEN_ALL_CORE
For information on Fall & Injury Prevention, visit: [email or web address] OR  [email or web address] OR  https://www.cdc.gov/steadi/patient.html For information on Fall & Injury Prevention, visit: [email or web address] OR  [email or web address] OR  https://www.cdc.gov/steadi/patient.html

## 2023-12-14 NOTE — PROGRESS NOTE ADULT - PROBLEM SELECTOR PLAN 1
Gyn Onc Fellow Addendum:    Pt seen and examined at bedside. Agree with above.    VS reviewed  Labs reviewed    Hemodynamically stable   Continue current pain regimen  Reg diet as tolerated  Ley out today    Encourage ambulation and IS use  Replete lytes prn  DVT ppx: HSQ, Gui   Dispo: cont inpt care     Alyse Schmitz MD
Gyn Onc Fellow Addendum:    Pt seen and examined at bedside. Agree with above.    VS reviewed  Labs reviewed    Hemodynamically stable   Continue current pain regimen  Tolerating reg diet   Voiding spontaneously  Encourage ambulation and IS use  Replete lytes prn  DVT ppx: HSQ, Venaliza   Dispo: anticipate discharge home today     Alyse Schmitz MD

## 2023-12-14 NOTE — PROGRESS NOTE ADULT - SUBJECTIVE AND OBJECTIVE BOX
Gyn ONC Progress Note POD#3  HD#4    Subjective:   Pt seen and examined at bedside. No events overnight. Pain well controlled. Patient ambulating, passing flatus, voiding spontaneously. Tolerating reg diet. Pt denies fever, chills, chest pain, SOB, nausea, vomiting, lightheadedness, dizziness.      Objective:  T(F): 98.2 (12-14-23 @ 01:40), Max: 98.9 (12-13-23 @ 09:34)  HR: 82 (12-14-23 @ 01:40) (67 - 82)  BP: 158/64 (12-14-23 @ 01:40) (146/60 - 164/61)  RR: 18 (12-14-23 @ 01:40) (16 - 18)  SpO2: 98% (12-14-23 @ 01:40) (98% - 100%)  Wt(kg): --  I&O's Summary    12 Dec 2023 07:01  -  13 Dec 2023 07:00  --------------------------------------------------------  IN: 3360 mL / OUT: 4100 mL / NET: -740 mL    13 Dec 2023 07:01  -  14 Dec 2023 05:51  --------------------------------------------------------  IN: 1610 mL / OUT: 2050 mL / NET: -440 mL      CAPILLARY BLOOD GLUCOSE      MEDICATIONS  (STANDING):  acetaminophen     Tablet .. 975 milliGRAM(s) Oral every 6 hours  artificial tears (preservative free) Ophthalmic Solution 1 Drop(s) Both EYES every 8 hours  chlorhexidine 2% Cloths 1 Application(s) Topical every 12 hours  escitalopram 20 milliGRAM(s) Oral daily  famotidine    Tablet 20 milliGRAM(s) Oral two times a day  heparin   Injectable 5000 Unit(s) SubCutaneous every 8 hours  ibuprofen  Tablet. 600 milliGRAM(s) Oral every 6 hours  levothyroxine 88 MICROGram(s) Oral daily  propranolol LA 60 milliGRAM(s) Oral daily  sodium chloride 0.9% lock flush 3 milliLiter(s) IV Push every 8 hours    MEDICATIONS  (PRN):  ondansetron Injectable 4 milliGRAM(s) IV Push every 6 hours PRN Nausea and/or Vomiting  oxyCODONE    IR 5 milliGRAM(s) Oral every 3 hours PRN Moderate to Severe  Pain (4 - 10)  senna 1 Tablet(s) Oral at bedtime PRN Constipation  simethicone 80 milliGRAM(s) Chew every 6 hours PRN Gas      Physical Exam:  Constitutional: NAD  CV: normal S1, S2  Lungs: CTA b/l   Abdomen:   Incision:   Extremities:     LABS:  12-13    135    |  98     |  4<L>   ----------------------------<  103<H>  3.6     |  27     |  0.48<L>    Ca    8.3<L>      13 Dec 2023 06:06  Phos  3.1       12-13  Mg     1.50      12-13            Urinalysis Basic - ( 13 Dec 2023 06:06 )    Color: x / Appearance: x / SG: x / pH: x  Gluc: 103 mg/dL / Ketone: x  / Bili: x / Urobili: x   Blood: x / Protein: x / Nitrite: x   Leuk Esterase: x / RBC: x / WBC x   Sq Epi: x / Non Sq Epi: x / Bacteria: x        Assessment/Plan: 78y POD# , s/p     Neuro: ******  CV: Hemodynamically stable  Pulm: Saturating well on RA. Increase incentive spirometry.  GI: DIET  : Ley in place. Adequate UOP vs Voiding Spontaneously  Heme: *****   - Pt will need home AC ppx x28 days for presumed hypercoagulable state; will send home w/ Abixaban 2.5mg BID  FEN: LR@     , replete electrolytes PRN  ID: Afebrile  Endo:   Dispo: continue inpatient care    Seen with GynOnc team  Sridevi Gr, PGY2 Gyn ONC Progress Note POD#3  HD#4    Subjective:   Pt seen and examined at bedside. Pt with BPs of 161/64 overnight, asymptomatic - night team notified, no changes to her BP medications. Pain well controlled. Patient ambulating, passing flatus, voiding spontaneously. Tolerating reg diet after receiving Bicitra - she had lasagna for dinner last night. She is feeling much improved and would like to go home today. Pt denies fever, chills, chest pain, SOB, nausea, vomiting, lightheadedness, dizziness.      Objective:  T(F): 98.2 (12-14-23 @ 01:40), Max: 98.9 (12-13-23 @ 09:34)  HR: 82 (12-14-23 @ 01:40) (67 - 82)  BP: 158/64 (12-14-23 @ 01:40) (146/60 - 164/61)  RR: 18 (12-14-23 @ 01:40) (16 - 18)  SpO2: 98% (12-14-23 @ 01:40) (98% - 100%)  Wt(kg): --  I&O's Summary    12 Dec 2023 07:01  -  13 Dec 2023 07:00  --------------------------------------------------------  IN: 3360 mL / OUT: 4100 mL / NET: -740 mL    13 Dec 2023 07:01  -  14 Dec 2023 05:51  --------------------------------------------------------  IN: 1610 mL / OUT: 2050 mL / NET: -440 mL      CAPILLARY BLOOD GLUCOSE      MEDICATIONS  (STANDING):  acetaminophen     Tablet .. 975 milliGRAM(s) Oral every 6 hours  artificial tears (preservative free) Ophthalmic Solution 1 Drop(s) Both EYES every 8 hours  chlorhexidine 2% Cloths 1 Application(s) Topical every 12 hours  escitalopram 20 milliGRAM(s) Oral daily  famotidine    Tablet 20 milliGRAM(s) Oral two times a day  heparin   Injectable 5000 Unit(s) SubCutaneous every 8 hours  ibuprofen  Tablet. 600 milliGRAM(s) Oral every 6 hours  levothyroxine 88 MICROGram(s) Oral daily  propranolol LA 60 milliGRAM(s) Oral daily  sodium chloride 0.9% lock flush 3 milliLiter(s) IV Push every 8 hours    MEDICATIONS  (PRN):  ondansetron Injectable 4 milliGRAM(s) IV Push every 6 hours PRN Nausea and/or Vomiting  oxyCODONE    IR 5 milliGRAM(s) Oral every 3 hours PRN Moderate to Severe  Pain (4 - 10)  senna 1 Tablet(s) Oral at bedtime PRN Constipation  simethicone 80 milliGRAM(s) Chew every 6 hours PRN Gas      Physical Exam:  Constitutional: NAD  CV: RRR, normal S1, S2  Lungs: CTA b/l, breathing sounds symmetric   Abdomen: Soft, nontender, nondistended, +BS  Incision: Midline vertical incision with Dermabond Prineo dressing CDI   Extremities: No LE edema or tenderness     LABS:  12-13    135    |  98     |  4<L>   ----------------------------<  103<H>  3.6     |  27     |  0.48<L>    Ca    8.3<L>      13 Dec 2023 06:06  Phos  3.1       12-13  Mg     1.50      12-13        Urinalysis Basic - ( 13 Dec 2023 06:06 )    Color: x / Appearance: x / SG: x / pH: x  Gluc: 103 mg/dL / Ketone: x  / Bili: x / Urobili: x   Blood: x / Protein: x / Nitrite: x   Leuk Esterase: x / RBC: x / WBC x   Sq Epi: x / Non Sq Epi: x / Bacteria: x

## 2023-12-14 NOTE — PROGRESS NOTE ADULT - ASSESSMENT
Assessment/Plan: 78y POD#3, s/p ex-lap DILIP, BSO, SNLMD for endometrial cancer (EBL: 100). Patient experiencing some nausea/vomiting, intolerance of PO on POD#1. Currently advancing diet slowly and monitoring bowel function. Patient otherwise recovering well postoperatively, with stable vital signs.     Neuro: PO Tylenol, Motrin, Oxy PRN.   Psych: c/w home Escitalopram  CV: Hemodynamically stable, Lopressor PRN for HTN.  - H/H: 11.2/36.2->10.0/31.7->10.5/33.0; stable   Pulm: Saturating well on RA. Increase incentive spirometry.  GI: Patient continues to have nausea, self limiting to clears. Advance diet as tolerated and continue to monitor closely.   - Senna, simethicone, zofran PRN  : Voiding independently, adequate UOP.   Heme: HSQ and SCDs  - will d/c with Apixaban x28d for presumed hypercoagulable state   FEN: SLIV, replete electrolytes PRN.   ID: Afebrile  Endo: c/w home Synthroid 88mcg  Dispo: Continue routine postop care Assessment/Plan: 78y POD#3, s/p ex-lap DILIP, BSO, SNLMD for endometrial cancer (EBL: 100). Patient experienced some nausea/vomiting, intolerance of PO on POD#1, with slow advancement of diet. Pt now tolerating reg diet without issue. Patient otherwise recovering well postoperatively, with stable vital signs.     Neuro: PO Tylenol, Motrin, Oxy PRN.   Psych: c/w home Escitalopram  CV: Hemodynamically stable, Pt on home dose of Propranolol with BPs 150s-160s/60s-70s  - H/H: 11.2/36.2->10.0/31.7->10.5/33.0; stable, f/u AM CBC   Pulm: Saturating well on RA. Increase incentive spirometry.  GI: Pt tolerating regular diet with resolution of nausea.   - Senna, simethicone, zofran PRN  : Voiding independently, adequate UOP(1.8 cc/kg/hr).   Heme: HSQ and SCDs  - will d/c with Apixaban x28d for presumed hypercoagulable state   FEN: SLIV, replete electrolytes PRN, f/u AM BMP.   ID: Afebrile  Endo: c/w home Synthroid 88mcg  Dispo: Continue routine postop care    Seen w/ GynOnc team   Brenna Sandra PGY-1

## 2023-12-15 ENCOUNTER — NON-APPOINTMENT (OUTPATIENT)
Age: 78
End: 2023-12-15

## 2023-12-27 ENCOUNTER — APPOINTMENT (OUTPATIENT)
Dept: GYNECOLOGIC ONCOLOGY | Facility: CLINIC | Age: 78
End: 2023-12-27
Payer: MEDICARE

## 2023-12-27 VITALS
BODY MASS INDEX: 22.14 KG/M2 | SYSTOLIC BLOOD PRESSURE: 158 MMHG | HEART RATE: 90 BPM | WEIGHT: 129 LBS | TEMPERATURE: 97.5 F | DIASTOLIC BLOOD PRESSURE: 76 MMHG

## 2023-12-27 PROCEDURE — 99024 POSTOP FOLLOW-UP VISIT: CPT

## 2023-12-29 LAB
SURGICAL PATHOLOGY STUDY: SIGNIFICANT CHANGE UP
SURGICAL PATHOLOGY STUDY: SIGNIFICANT CHANGE UP

## 2023-12-29 NOTE — REASON FOR VISIT
[Post Op] : post op visit [de-identified] : 12/11/23 [de-identified] : DILIP, BSO, SLND [de-identified] : Feels well. Denies fever, chills, pain or significant bleeding.

## 2023-12-29 NOTE — LETTER BODY
[Dear  ___] : Dear  [unfilled], [I recently saw our patient [unfilled] for a follow-up visit.] : I recently saw our patient, [unfilled] for a follow-up visit. [Attached please find my note.] : Attached please find my note. [FreeTextEntry1] : Pathology 12/11/23

## 2023-12-29 NOTE — PLAN
[TextEntry] : Make a good recuperation so far.  Still with fatigue but minimal pain.  Discussed recuperation and answered questions.  Awaiting final pathology results.  I will call patient once they are available.   Addendum 12/29/23: Final pathology shows a stage IA G1 pMMR endometrial cancer.  There is only superficial MI, however, the myometrium of the CHALO is markedly thinned out, measuring only 1-2 mm.  There is cervical glandular involvement but no stromal invasion.  In light of these findings I would recommend radiation oncology consultation for consideration of VBRT.  Reviewed rational for such treatment.  Amenable to consultation.  All questions answered.

## 2023-12-29 NOTE — DISCUSSION/SUMMARY
[Clean] : was clean [Dry] : was dry [Intact] : was intact [Doing Well] : is doing well [Excellent Pain Control] : has excellent pain control [No Sign of Infection] : is showing no signs of infection [0] : 0 [Reviewed] : reviewed [None] : had no drainage [Normal Skin] : normal appearance [Firm] : soft [Tender] : nontender [External Genitalia Abnormal] : normal external genitalia [Vaginal Exam Abnormal] : normal vaginal exam [de-identified] : cuff intact [de-identified] : Path: pending

## 2024-01-03 ENCOUNTER — NON-APPOINTMENT (OUTPATIENT)
Age: 79
End: 2024-01-03

## 2024-01-09 ENCOUNTER — OUTPATIENT (OUTPATIENT)
Dept: OUTPATIENT SERVICES | Facility: HOSPITAL | Age: 79
LOS: 1 days | Discharge: HOME | End: 2024-01-09
Payer: MEDICARE

## 2024-01-09 DIAGNOSIS — E89.0 POSTPROCEDURAL HYPOTHYROIDISM: Chronic | ICD-10-CM

## 2024-01-09 DIAGNOSIS — Z98.890 OTHER SPECIFIED POSTPROCEDURAL STATES: Chronic | ICD-10-CM

## 2024-01-09 DIAGNOSIS — Z90.11 ACQUIRED ABSENCE OF RIGHT BREAST AND NIPPLE: Chronic | ICD-10-CM

## 2024-01-09 PROBLEM — C73 MALIGNANT NEOPLASM OF THYROID GLAND: Chronic | Status: ACTIVE | Noted: 2023-12-06

## 2024-01-09 PROBLEM — I10 ESSENTIAL (PRIMARY) HYPERTENSION: Chronic | Status: ACTIVE | Noted: 2023-12-06

## 2024-01-09 PROBLEM — E78.5 HYPERLIPIDEMIA, UNSPECIFIED: Chronic | Status: ACTIVE | Noted: 2023-12-06

## 2024-01-09 PROBLEM — C50.919 MALIGNANT NEOPLASM OF UNSPECIFIED SITE OF UNSPECIFIED FEMALE BREAST: Chronic | Status: ACTIVE | Noted: 2023-12-06

## 2024-01-09 PROBLEM — F41.9 ANXIETY DISORDER, UNSPECIFIED: Chronic | Status: ACTIVE | Noted: 2023-12-06

## 2024-01-18 ENCOUNTER — APPOINTMENT (OUTPATIENT)
Dept: RADIATION ONCOLOGY | Facility: CLINIC | Age: 79
End: 2024-01-18

## 2024-01-18 ENCOUNTER — APPOINTMENT (OUTPATIENT)
Dept: RADIATION ONCOLOGY | Facility: CLINIC | Age: 79
End: 2024-01-18
Payer: MEDICARE

## 2024-01-18 VITALS
DIASTOLIC BLOOD PRESSURE: 76 MMHG | TEMPERATURE: 97.88 F | HEIGHT: 64 IN | SYSTOLIC BLOOD PRESSURE: 171 MMHG | RESPIRATION RATE: 18 BRPM | BODY MASS INDEX: 22.02 KG/M2 | WEIGHT: 128.97 LBS | OXYGEN SATURATION: 100 % | HEART RATE: 73 BPM

## 2024-01-18 DIAGNOSIS — Z80.42 FAMILY HISTORY OF MALIGNANT NEOPLASM OF PROSTATE: ICD-10-CM

## 2024-01-18 PROCEDURE — 99204 OFFICE O/P NEW MOD 45 MIN: CPT

## 2024-01-18 RX ORDER — PANTOPRAZOLE 40 MG/1
40 TABLET, DELAYED RELEASE ORAL
Refills: 0 | Status: ACTIVE | COMMUNITY
Start: 2024-01-18

## 2024-01-18 RX ORDER — TRIAMTERENE 50 MG/1
CAPSULE ORAL
Refills: 0 | Status: DISCONTINUED | COMMUNITY

## 2024-01-18 RX ORDER — MAGNESIUM 200 MG
1000 TABLET ORAL
Refills: 0 | Status: ACTIVE | COMMUNITY
Start: 2024-01-18

## 2024-01-18 NOTE — VITALS
[Maximal Pain Intensity: 0/10] : 0/10 [90: Able to carry normal activity; minor signs or symptoms of disease.] : 90: Able to carry normal activity; minor signs or symptoms of disease.  [Date: ____________] : Patient's last distress assessment performed on [unfilled]. [3 - Distress Level] : Distress Level: 3

## 2024-01-19 NOTE — PHYSICAL EXAM
[Normal] : well developed, well nourished, in no acute distress [] : no respiratory distress [Normal External Genitalia] : normal external genitalia  [Normal Vaginal Cuff] : vaginal cuff without lesion or nodularity [Inguinal Lymph Nodes Enlarged Bilaterally] : inguinal [de-identified] : well healed surgical incision

## 2024-01-19 NOTE — HISTORY OF PRESENT ILLNESS
[FreeTextEntry1] : Ms. Solano is a 77 yo postmenopausal female with newly diagnosed endometrial cancer.   She presented with PMB. EML was markedly thickened at 45 mm.   EMBx performed 11/1/23 revealed fragments of endometrial adenocarcinoma, FIGO grade 1.   Pelvic MRI on 11/4/23 ([facility]): uterus 9.5 x 5.4 x 6.7 cm. EML 45 mm. There is a 1.0 x 0.7 cm broad based polypoid lesion within the endometrial cavity along the midline anterior uterine body. There is a 6.2 x 3.5 x 3.6 cm mass within the cervix and CHALO. There is no definite vaginal or parametrial involvement. At least 10 uterine fibroids. The largest 1.8 x 1.0 x 1.1 cm midline posterior uterine fundus intramural fibroid with calcifications. Normal ovaries. No ascites or LAD.  She underwent DILIP, BSO, SLND by Dr. Jacques on 12/11/23.  Pathology: Endometrioid carcinoma (FIGO Grade 1). Superficial myometrial invasion identified. Adenomyosis involved by carcinoma. No lymph-vascular invasion identified. Endocervical glandular involvement only. Bilateral ovaries and fallopian tube negative for carcinoma. All lymph nodes negative.  The carcinoma extensively involves the endometrium, adenomyosis, an irregular endomyometrial junction, and endocervical glands.  The myometrial wall along these areas is very thin (in some areas 1 to 2 mm thick).  And so although definitive invasion is only seen superficially, in some areas the tumor comes close to the serosa (1 to 2 mm away) because of the very thin myometrial wall.  She presents today to discuss radiation therapy options, accompanied by her  Anthony.  She is generally feeling well; still notes mild fatigue and weakness since surgery, but has been improving over the past 1-2 weeks. She has started to exercise again as well. Appetite is improving. She has been feeling somewhat "fuzzy" since surgery, slightly forgetful. No GI/ complaints, no vaginal bleeding or discharge.   PMHx: HTN, HLD, hx of thyroid cancer 2013 treated with thyroidectomy, breast cancer 2014 treated with radiation, surgery and 5 years of Arimidex PSHx: right mastectomy, thyroidectomy Fam Hx: mother (thyroid cancer), father (prostate cancer)

## 2024-01-20 ENCOUNTER — OFFICE (OUTPATIENT)
Dept: URBAN - METROPOLITAN AREA CLINIC 34 | Facility: CLINIC | Age: 79
Setting detail: OPHTHALMOLOGY
End: 2024-01-20
Payer: MEDICARE

## 2024-01-20 DIAGNOSIS — H81.4: ICD-10-CM

## 2024-01-20 DIAGNOSIS — G43.109: ICD-10-CM

## 2024-01-20 DIAGNOSIS — H40.013: ICD-10-CM

## 2024-01-20 DIAGNOSIS — H16.221: ICD-10-CM

## 2024-01-20 PROCEDURE — 99213 OFFICE O/P EST LOW 20 MIN: CPT | Performed by: OPHTHALMOLOGY

## 2024-01-20 ASSESSMENT — REFRACTION_MANIFEST
OS_VA1: 20/20
OS_SPHERE: +0.25
OD_CYLINDER: -1.25
OD_VA2: 20/20
OS_CYLINDER: -0.75
OS_AXIS: 85
OD_ADD: +2.75
OD_AXIS: 125
OS_ADD: +2.75
OU_VA: 20/15-2
OD_VA1: 20/20
OD_CYLINDER: -1.25
OD_AXIS: 120
OD_VA1: 20/20
OD_SPHERE: +0.75
OD_SPHERE: +0.50

## 2024-01-20 ASSESSMENT — REFRACTION_CURRENTRX
OS_OVR_VA: 20/
OD_AXIS: 128
OD_CYLINDER: -1.25
OD_OVR_VA: 20/
OS_CYLINDER: -0.75
OS_AXIS: 002
OD_SPHERE: +0.50
OS_SPHERE: +0.25

## 2024-01-20 ASSESSMENT — CONFRONTATIONAL VISUAL FIELD TEST (CVF)
OD_FINDINGS: FULL
OS_FINDINGS: FULL

## 2024-01-20 ASSESSMENT — SPHEQUIV_DERIVED
OS_SPHEQUIV: -0.125
OS_SPHEQUIV: -0.25
OD_SPHEQUIV: 0
OD_SPHEQUIV: 0.125
OD_SPHEQUIV: -0.125

## 2024-01-20 ASSESSMENT — REFRACTION_AUTOREFRACTION
OD_CYLINDER: +2.00
OD_SPHERE: -1.00
OS_SPHERE: -1.00
OD_AXIS: 015
OS_AXIS: 167
OS_CYLINDER: +1.50

## 2024-01-20 ASSESSMENT — SUPERFICIAL PUNCTATE KERATITIS (SPK): OD_SPK: 1+

## 2024-01-22 DIAGNOSIS — C54.1 MALIGNANT NEOPLASM OF ENDOMETRIUM: ICD-10-CM

## 2024-01-24 PROCEDURE — 77332 RADIATION TREATMENT AID(S): CPT | Mod: 26

## 2024-01-24 PROCEDURE — 77290 THER RAD SIMULAJ FIELD CPLX: CPT | Mod: 26

## 2024-01-26 PROCEDURE — 77295 3-D RADIOTHERAPY PLAN: CPT | Mod: 26

## 2024-02-13 PROCEDURE — 77332 RADIATION TREATMENT AID(S): CPT | Mod: 26

## 2024-02-13 PROCEDURE — 77770 HDR RDNCL NTRSTL/ICAV BRCHTX: CPT | Mod: 26

## 2024-02-13 PROCEDURE — 57156 INS VAG BRACHYTX DEVICE: CPT

## 2024-02-16 PROCEDURE — 77770 HDR RDNCL NTRSTL/ICAV BRCHTX: CPT | Mod: 26

## 2024-02-16 PROCEDURE — 57156 INS VAG BRACHYTX DEVICE: CPT

## 2024-02-16 PROCEDURE — 77332 RADIATION TREATMENT AID(S): CPT | Mod: 26

## 2024-02-27 ENCOUNTER — NON-APPOINTMENT (OUTPATIENT)
Age: 79
End: 2024-02-27

## 2024-03-26 ENCOUNTER — APPOINTMENT (OUTPATIENT)
Dept: RADIATION ONCOLOGY | Facility: CLINIC | Age: 79
End: 2024-03-26
Payer: MEDICARE

## 2024-03-26 VITALS
HEIGHT: 64 IN | HEART RATE: 64 BPM | SYSTOLIC BLOOD PRESSURE: 179 MMHG | RESPIRATION RATE: 18 BRPM | OXYGEN SATURATION: 100 % | BODY MASS INDEX: 21.42 KG/M2 | WEIGHT: 125.44 LBS | DIASTOLIC BLOOD PRESSURE: 82 MMHG | TEMPERATURE: 96.3 F

## 2024-03-26 DIAGNOSIS — Z92.3 PERSONAL HISTORY OF IRRADIATION: ICD-10-CM

## 2024-03-26 PROCEDURE — 99214 OFFICE O/P EST MOD 30 MIN: CPT

## 2024-03-26 RX ORDER — ROSUVASTATIN CALCIUM 10 MG/1
10 TABLET, FILM COATED ORAL
Refills: 0 | Status: ACTIVE | COMMUNITY

## 2024-03-26 RX ORDER — CITALOPRAM HYDROBROMIDE 20 MG/1
20 TABLET, FILM COATED ORAL
Refills: 0 | Status: ACTIVE | COMMUNITY

## 2024-03-26 RX ORDER — CALCIUM CITRATE/VITAMIN D3 315MG-6.25
TABLET ORAL
Refills: 0 | Status: ACTIVE | COMMUNITY

## 2024-03-26 RX ORDER — PROPRANOLOL HYDROCHLORIDE 60 MG/1
60 TABLET ORAL
Refills: 0 | Status: ACTIVE | COMMUNITY

## 2024-03-26 NOTE — PHYSICAL EXAM
[Thin] : thin [] : no respiratory distress [Abdomen Soft] : soft [No Rectal Mass] : no rectal mass [Normal External Genitalia] : normal external genitalia  [Normal Vaginal Cuff] : vaginal cuff without lesion or nodularity

## 2024-03-26 NOTE — HISTORY OF PRESENT ILLNESS
[FreeTextEntry1] : Ms. Solano is a 79 yo postmenopausal female with newly diagnosed endometrial cancer.   She presented with PMB. EML was markedly thickened at 45 mm.   EMBx performed 11/1/23 revealed fragments of endometrial adenocarcinoma, FIGO grade 1.   Pelvic MRI on 11/4/23 ([facility]): uterus 9.5 x 5.4 x 6.7 cm. EML 45 mm. There is a 1.0 x 0.7 cm broad based polypoid lesion within the endometrial cavity along the midline anterior uterine body. There is a 6.2 x 3.5 x 3.6 cm mass within the cervix and CHALO. There is no definite vaginal or parametrial involvement. At least 10 uterine fibroids. The largest 1.8 x 1.0 x 1.1 cm midline posterior uterine fundus intramural fibroid with calcifications. Normal ovaries. No ascites or LAD.  She underwent DILIP, BSO, SLND by Dr. Jacques on 12/11/23.  Pathology: Endometrioid carcinoma (FIGO Grade 1). Superficial myometrial invasion identified. Adenomyosis involved by carcinoma. No lymph-vascular invasion identified. Endocervical glandular involvement only. Bilateral ovaries and fallopian tube negative for carcinoma. All lymph nodes negative.  The carcinoma extensively involves the endometrium, adenomyosis, an irregular endomyometrial junction, and endocervical glands.  The myometrial wall along these areas is very thin (in some areas 1 to 2 mm thick).  And so although definitive invasion is only seen superficially, in some areas the tumor comes close to the serosa (1 to 2 mm away) because of the very thin myometrial wall.  PMHx: HTN, HLD, hx of thyroid cancer 2013 treated with thyroidectomy, breast cancer 2014 treated with radiation, surgery and 5 years of Arimidex PSHx: right mastectomy, thyroidectomy Fam Hx: mother (thyroid cancer), father (prostate cancer)  2/20/2024 Completed cylinder brachytherapy total dose of 2100cGy in 3 fractions.   3/26/2024 Presents today in follow up. Doing well. No vaginal bleeding or discharge. Reports urinary urgency which she discussed with Dr. Jacques who suggested doing kegel exercises. Occasionally sexually active. Reports no pain with intercourse.

## 2024-03-26 NOTE — REVIEW OF SYSTEMS
[Constipation: Grade 0] : Constipation: Grade 0 [Diarrhea: Grade 0] : Diarrhea: Grade 0 [Fecal Incontinence: Grade 0] : Fecal Incontinence: Grade 0 [Rectal Pain: Grade 0] : Rectal Pain: Grade 0 [Fatigue: Grade 1 - Fatigue relieved by rest] : Fatigue: Grade 1 - Fatigue relieved by rest [Hematuria: Grade 0] : Hematuria: Grade 0 [Urinary Incontinence: Grade 0] : Urinary Incontinence: Grade 0  [Urinary Retention: Grade 0] : Urinary Retention: Grade 0 [Urinary Tract Pain: Grade 0] : Urinary Tract Pain: Grade 0 [Urinary Urgency: Grade 1 - Present] : Urinary Urgency: Grade 1 - Present [Urinary Frequency: Grade 0] : Urinary Frequency: Grade 0 [Vaginal Stricture: Grade 0] : Vaginal Stricture: Grade 0 [Dyspareunia: Grade 0] : Dyspareunia: Grade 0 [Skin Hyperpigmentation: Grade 0] : Skin Hyperpigmentation: Grade 0 [Dermatitis Radiation: Grade 0] : Dermatitis Radiation: Grade 0

## 2024-04-01 ENCOUNTER — NON-APPOINTMENT (OUTPATIENT)
Age: 79
End: 2024-04-01

## 2024-04-01 DIAGNOSIS — C54.1 MALIGNANT NEOPLASM OF ENDOMETRIUM: ICD-10-CM

## 2024-04-02 PROBLEM — C54.1 ENDOMETRIAL CANCER, GRADE I: Status: ACTIVE | Noted: 2024-01-19

## 2024-04-09 ENCOUNTER — APPOINTMENT (OUTPATIENT)
Dept: GYNECOLOGIC ONCOLOGY | Facility: CLINIC | Age: 79
End: 2024-04-09
Payer: MEDICARE

## 2024-04-09 VITALS
SYSTOLIC BLOOD PRESSURE: 134 MMHG | WEIGHT: 125.25 LBS | DIASTOLIC BLOOD PRESSURE: 71 MMHG | HEART RATE: 106 BPM | BODY MASS INDEX: 21.5 KG/M2

## 2024-04-09 DIAGNOSIS — C54.1 MALIGNANT NEOPLASM OF ENDOMETRIUM: ICD-10-CM

## 2024-04-09 PROCEDURE — 99459 PELVIC EXAMINATION: CPT

## 2024-04-09 PROCEDURE — G2211 COMPLEX E/M VISIT ADD ON: CPT

## 2024-04-09 PROCEDURE — 99213 OFFICE O/P EST LOW 20 MIN: CPT

## 2024-04-09 NOTE — HISTORY OF PRESENT ILLNESS
[FreeTextEntry1] : Stage 1A endometrioid carcinoma, cervical mucosal involvement DILIP, BSO, SLND, 12/11/23 TB recommendation on 1/3/24: VBT Referring GYN - Dr. Arielle Quesada PCP - Dr. Elvia Duncan Rad Onc - Dr. Corona  Ms. Solano presents for surveillance.   2/20/2024 Completed VBT 2100cGy in 3 fractions Vaginal dilators given. Using intermittently.

## 2024-04-09 NOTE — ASSESSMENT
[FreeTextEntry1] : Stage IA G1 pMMR EMCA with cervical mucosal involvement.  Treated with surgery and VBRT.  JESSICA.

## 2024-04-09 NOTE — PLAN
[TextEntry] : Reviewed signs and symptoms of recEMCA.  Discussed importance of use of vaginal dilator.  Return in 4 months.

## 2024-06-24 ENCOUNTER — APPOINTMENT (OUTPATIENT)
Dept: ORTHOPEDIC SURGERY | Facility: CLINIC | Age: 79
End: 2024-06-24

## 2024-08-13 ENCOUNTER — APPOINTMENT (OUTPATIENT)
Dept: GYNECOLOGIC ONCOLOGY | Facility: CLINIC | Age: 79
End: 2024-08-13

## 2024-09-11 ENCOUNTER — APPOINTMENT (OUTPATIENT)
Dept: ORTHOPEDIC SURGERY | Facility: CLINIC | Age: 79
End: 2024-09-11
Payer: MEDICARE

## 2024-09-11 DIAGNOSIS — M67.449 GANGLION, UNSPECIFIED HAND: ICD-10-CM

## 2024-09-11 DIAGNOSIS — M65.352 TRIGGER FINGER, LEFT LITTLE FINGER: ICD-10-CM

## 2024-09-11 DIAGNOSIS — M65.342 TRIGGER FINGER, LEFT RING FINGER: ICD-10-CM

## 2024-09-11 DIAGNOSIS — M19.132 POST-TRAUMATIC OSTEOARTHRITIS, LEFT WRIST: ICD-10-CM

## 2024-09-11 DIAGNOSIS — M19.041 PRIMARY OSTEOARTHRITIS, RIGHT HAND: ICD-10-CM

## 2024-09-11 DIAGNOSIS — S66.812A STRAIN OF OTHER SPECIFIED MUSCLES, FASCIA AND TENDONS AT WRIST AND HAND LEVEL, LEFT HAND, INITIAL ENCOUNTER: ICD-10-CM

## 2024-09-11 DIAGNOSIS — M18.12 UNILATERAL PRIMARY OSTEOARTHRITIS OF FIRST CARPOMETACARPAL JOINT, LEFT HAND: ICD-10-CM

## 2024-09-11 PROCEDURE — 73140 X-RAY EXAM OF FINGER(S): CPT | Mod: RT

## 2024-09-11 PROCEDURE — 20550 NJX 1 TENDON SHEATH/LIGAMENT: CPT | Mod: F4

## 2024-09-11 PROCEDURE — 99214 OFFICE O/P EST MOD 30 MIN: CPT | Mod: 25

## 2024-09-11 NOTE — IMAGING
[Left] : left hand [de-identified] :  Left ring and little fingers with loss of extensor tendon function. no ttp over the little finger A1 pulleys / mild ttp over the left ring finger A1 pulley/ no triggering. left wrist dorsal synovitis noted. Pt denies ttp over the hand or wrist.    Right index finger with mucous cyst of the index finger DIP with mild ulnar deviation and no ttp. all digits are nvi with FAROM.   Right index finger 3 view xray shows PIP and DIP joint moderate OA with dorsal bone spur.  Review of previous left wrist xray shows severe DRUJ OA.   [FreeTextEntry1] : DRUJ and 1st CMC OA

## 2024-09-11 NOTE — ASSESSMENT
[FreeTextEntry1] : After a discussion of the risks, benefits and alternatives along with the expectations, the patient was amenable to injection.  The indication for injection is pain, inflammation and radiographically confirmed arthritis.  The skin overlying the carpometacarpal joint was prepared with alcohol and ethyl chloride was sprayed topically.  Sterile technique was used. An injection of 1ml of lidocaine and 6mg of betamethasone was used.  The patient was instructed to call if redness, pain or fever occur.  They may apply ice for 15 minutes eery hour for the next 12-24 hours as tolerated.  .  The patient understands that it may take 2-5 days to see a noticeable difference.  Sterile Band-Aid was applied.  Pt was given little trigger finger CSI #1 and left ring trigger finger CSI #2 today.  Pt informed that in order to recover extensor tendon function of the left ring and little fingers is to perform distal ulnar resection px and repair the extensor tendons. This will be scheduled in the near future  Risks including but not limited to infection, blood loss, tendon damage and delayed tendon disruption, muscle damage, nerve damage, stiffness, pain, arthritis, loss of function, potential for secondary surgery, loss of limb or life. The patient had the opportunity to ask questions and all questions were answered to their satisfaction.   Pt states she will consider this and she is informed if she opts against surgery, she will have no ability to extend these digits again.  Pt will wear wrist brace for comfort.  Right index finger DIP joint cyst aspirated today (1 cc serous fluid).

## 2024-09-11 NOTE — IMAGING
[Left] : left hand [de-identified] :  Left ring and little fingers with loss of extensor tendon function. no ttp over the little finger A1 pulleys / mild ttp over the left ring finger A1 pulley/ no triggering. left wrist dorsal synovitis noted. Pt denies ttp over the hand or wrist.    Right index finger with mucous cyst of the index finger DIP with mild ulnar deviation and no ttp. all digits are nvi with FAROM.   Right index finger 3 view xray shows PIP and DIP joint moderate OA with dorsal bone spur.  Review of previous left wrist xray shows severe DRUJ OA.   [FreeTextEntry1] : DRUJ and 1st CMC OA

## 2024-09-11 NOTE — HISTORY OF PRESENT ILLNESS
[de-identified] : 9/11/24: following up. again experiencing lt ring finger and little trigger finger, . Pt also complains of right index finger DIP mass.    5/30/23:  pt no longer has triggering in left index finger after CSI.  Pt was given CSI in left DRUJ and still has pain.  Pt also has left basal joint pain, pt has not received CSI there.  5/16/23:  A step fell on the patient's left hand in the end of November.  Pt had laceration that was sutured but she still has pain in her hand.

## 2024-09-11 NOTE — HISTORY OF PRESENT ILLNESS
[de-identified] : 9/11/24: following up. again experiencing lt ring finger and little trigger finger, . Pt also complains of right index finger DIP mass.    5/30/23:  pt no longer has triggering in left index finger after CSI.  Pt was given CSI in left DRUJ and still has pain.  Pt also has left basal joint pain, pt has not received CSI there.  5/16/23:  A step fell on the patient's left hand in the end of November.  Pt had laceration that was sutured but she still has pain in her hand.

## 2024-09-19 ENCOUNTER — APPOINTMENT (OUTPATIENT)
Dept: RADIATION ONCOLOGY | Facility: CLINIC | Age: 79
End: 2024-09-19
Payer: MEDICARE

## 2024-09-19 VITALS
SYSTOLIC BLOOD PRESSURE: 136 MMHG | HEART RATE: 67 BPM | RESPIRATION RATE: 16 BRPM | OXYGEN SATURATION: 100 % | DIASTOLIC BLOOD PRESSURE: 83 MMHG

## 2024-09-19 DIAGNOSIS — Z92.3 PERSONAL HISTORY OF IRRADIATION: ICD-10-CM

## 2024-09-19 DIAGNOSIS — Z85.42 ENCOUNTER FOR FOLLOW-UP EXAMINATION AFTER COMPLETED TREATMENT FOR MALIGNANT NEOPLASM: ICD-10-CM

## 2024-09-19 DIAGNOSIS — Z08 ENCOUNTER FOR FOLLOW-UP EXAMINATION AFTER COMPLETED TREATMENT FOR MALIGNANT NEOPLASM: ICD-10-CM

## 2024-09-19 PROCEDURE — 99212 OFFICE O/P EST SF 10 MIN: CPT | Mod: GC

## 2024-09-19 NOTE — PHYSICAL EXAM
[Thin] : thin [] : no respiratory distress [Abdomen Soft] : soft [No Rectal Mass] : no rectal mass [Normal External Genitalia] : normal external genitalia  [Normal Vaginal Cuff] : vaginal cuff without lesion or nodularity [Normal] : well developed, well nourished, in no acute distress [Respiration, Rhythm And Depth] : normal respiratory rhythm and effort [No Focal Deficits] : no focal deficits [Oriented To Time, Place, And Person] : oriented to person, place, and time [Affect] : the affect was normal [de-identified] : vaginal cuff without masses or nodularity. no blood on exam

## 2024-09-19 NOTE — PHYSICAL EXAM
[Thin] : thin [] : no respiratory distress [Abdomen Soft] : soft [No Rectal Mass] : no rectal mass [Normal External Genitalia] : normal external genitalia  [Normal Vaginal Cuff] : vaginal cuff without lesion or nodularity [Normal] : well developed, well nourished, in no acute distress [Respiration, Rhythm And Depth] : normal respiratory rhythm and effort [No Focal Deficits] : no focal deficits [Oriented To Time, Place, And Person] : oriented to person, place, and time [Affect] : the affect was normal [de-identified] : vaginal cuff without masses or nodularity. no blood on exam

## 2024-09-19 NOTE — REVIEW OF SYSTEMS
[Constipation: Grade 0] : Constipation: Grade 0 [Diarrhea: Grade 0] : Diarrhea: Grade 0 [Fecal Incontinence: Grade 0] : Fecal Incontinence: Grade 0 [Rectal Pain: Grade 0] : Rectal Pain: Grade 0 [Fatigue: Grade 1 - Fatigue relieved by rest] : Fatigue: Grade 1 - Fatigue relieved by rest [Hematuria: Grade 0] : Hematuria: Grade 0 [Urinary Incontinence: Grade 0] : Urinary Incontinence: Grade 0  [Urinary Retention: Grade 0] : Urinary Retention: Grade 0 [Urinary Tract Pain: Grade 0] : Urinary Tract Pain: Grade 0 [Urinary Urgency: Grade 1 - Present] : Urinary Urgency: Grade 1 - Present [Urinary Frequency: Grade 0] : Urinary Frequency: Grade 0 [Vaginal Stricture: Grade 0] : Vaginal Stricture: Grade 0 [Dyspareunia: Grade 0] : Dyspareunia: Grade 0 [Skin Hyperpigmentation: Grade 0] : Skin Hyperpigmentation: Grade 0 [Dermatitis Radiation: Grade 0] : Dermatitis Radiation: Grade 0 [Urinary Urgency: Grade 0] : Urinary Urgency: Grade 0

## 2024-09-19 NOTE — PHYSICAL EXAM
[Thin] : thin [] : no respiratory distress [Abdomen Soft] : soft [No Rectal Mass] : no rectal mass [Normal External Genitalia] : normal external genitalia  [Normal Vaginal Cuff] : vaginal cuff without lesion or nodularity [Normal] : well developed, well nourished, in no acute distress [Respiration, Rhythm And Depth] : normal respiratory rhythm and effort [No Focal Deficits] : no focal deficits [Oriented To Time, Place, And Person] : oriented to person, place, and time [Affect] : the affect was normal [de-identified] : vaginal cuff without masses or nodularity. no blood on exam

## 2024-09-19 NOTE — HISTORY OF PRESENT ILLNESS
[FreeTextEntry1] : Ms. Solano is a 78 yo postmenopausal female diagnosed with Stage 1A endometrial adenocarcinoma seen today for her routine follow up after completing VBT in February 2024.  She underwent DILIP, BSO, SLND by Dr. Jacques on 12/11/23.  Pathology: Endometrioid carcinoma (FIGO Grade 1). Superficial myometrial invasion identified. Adenomyosis involved by carcinoma. No lymph-vascular invasion identified. Endocervical glandular involvement only. Bilateral ovaries and fallopian tube negative for carcinoma. All lymph nodes negative.  The carcinoma extensively involves the endometrium, adenomyosis, an irregular endomyometrial junction, and endocervical glands.  The myometrial wall along these areas is very thin (in some areas 1 to 2 mm thick).  And so although definitive invasion is only seen superficially, in some areas the tumor comes close to the serosa (1 to 2 mm away) because of the very thin myometrial wall.  2/20/2024:  Completed vaginal brachytherapy to a total dose of 2100cGy in 3 fractions.   3/26/2024: Presents today in follow up. Doing well. No vaginal bleeding or discharge. Reports urinary urgency which she discussed with Dr. Jacques who suggested doing kegel exercises. Occasionally sexually active. Reports no pain with intercourse.   9/19/24: Presents today for routine follow-up. Continues following with Dr. Jacques. She is feeling well, no abdominal or pelvic pain, no vaginal bleeding, no GI/ issues. She has stress and fatigue due to her  being hospitalized with sepsis after a knee surgery. He is now doing much better and they are both starting to recuperate. She started using the dilators but fell off when her  was hospitalized. She has recently started using them again.

## 2024-09-19 NOTE — HISTORY OF PRESENT ILLNESS
[FreeTextEntry1] : Ms. Solano is a 80 yo postmenopausal female diagnosed with Stage 1A endometrial adenocarcinoma seen today for her routine follow up after completing VBT in February 2024.  She underwent DILIP, BSO, SLND by Dr. Jacques on 12/11/23.  Pathology: Endometrioid carcinoma (FIGO Grade 1). Superficial myometrial invasion identified. Adenomyosis involved by carcinoma. No lymph-vascular invasion identified. Endocervical glandular involvement only. Bilateral ovaries and fallopian tube negative for carcinoma. All lymph nodes negative.  The carcinoma extensively involves the endometrium, adenomyosis, an irregular endomyometrial junction, and endocervical glands.  The myometrial wall along these areas is very thin (in some areas 1 to 2 mm thick).  And so although definitive invasion is only seen superficially, in some areas the tumor comes close to the serosa (1 to 2 mm away) because of the very thin myometrial wall.  2/20/2024:  Completed vaginal brachytherapy to a total dose of 2100cGy in 3 fractions.   3/26/2024: Presents today in follow up. Doing well. No vaginal bleeding or discharge. Reports urinary urgency which she discussed with Dr. Jacques who suggested doing kegel exercises. Occasionally sexually active. Reports no pain with intercourse.   9/19/24: Presents today for routine follow-up. Continues following with Dr. Jacques. She is feeling well, no abdominal or pelvic pain, no vaginal bleeding, no GI/ issues. She has stress and fatigue due to her  being hospitalized with sepsis after a knee surgery. He is now doing much better and they are both starting to recuperate. She started using the dilators but fell off when her  was hospitalized. She has recently started using them again.

## 2024-09-24 PROBLEM — Z08 ENCOUNTER FOR FOLLOW-UP SURVEILLANCE OF UTERINE CANCER: Status: ACTIVE | Noted: 2024-09-24

## 2024-10-02 ENCOUNTER — APPOINTMENT (OUTPATIENT)
Dept: GYNECOLOGIC ONCOLOGY | Facility: CLINIC | Age: 79
End: 2024-10-02
Payer: MEDICARE

## 2024-10-02 VITALS
RESPIRATION RATE: 16 BRPM | WEIGHT: 130 LBS | BODY MASS INDEX: 22.2 KG/M2 | HEART RATE: 71 BPM | DIASTOLIC BLOOD PRESSURE: 68 MMHG | TEMPERATURE: 97.2 F | HEIGHT: 64 IN | OXYGEN SATURATION: 98 % | SYSTOLIC BLOOD PRESSURE: 148 MMHG

## 2024-10-02 DIAGNOSIS — Z92.3 PERSONAL HISTORY OF IRRADIATION: ICD-10-CM

## 2024-10-02 DIAGNOSIS — C54.1 MALIGNANT NEOPLASM OF ENDOMETRIUM: ICD-10-CM

## 2024-10-02 PROCEDURE — 99213 OFFICE O/P EST LOW 20 MIN: CPT

## 2024-10-02 PROCEDURE — 99459 PELVIC EXAMINATION: CPT

## 2024-10-02 PROCEDURE — G2211 COMPLEX E/M VISIT ADD ON: CPT

## 2024-10-02 NOTE — PLAN
[TextEntry] : Reviewed signs and symptoms of recEMCA.  Discussed importance of use of vaginal dilator.  Return in 6 months.

## 2024-10-02 NOTE — PHYSICAL EXAM
[Chaperone Present] : A chaperone was present in the examining room during all aspects of the physical examination [10096] : A chaperone was present during the pelvic exam. [FreeTextEntry2] : Marti [Absent] : Adnexa(ae): Absent [Normal] : Recto-Vaginal Exam: Normal [de-identified] : vertical lap scar [Fully active, able to carry on all pre-disease performance without restriction] : Status 0 - Fully active, able to carry on all pre-disease performance without restriction

## 2024-11-22 ENCOUNTER — OFFICE (OUTPATIENT)
Facility: LOCATION | Age: 79
Setting detail: OPHTHALMOLOGY
End: 2024-11-22
Payer: MEDICARE

## 2024-11-22 DIAGNOSIS — H16.221: ICD-10-CM

## 2024-11-22 DIAGNOSIS — H43.813: ICD-10-CM

## 2024-11-22 DIAGNOSIS — H81.4: ICD-10-CM

## 2024-11-22 DIAGNOSIS — G43.109: ICD-10-CM

## 2024-11-22 DIAGNOSIS — H40.013: ICD-10-CM

## 2024-11-22 PROBLEM — Z96.1 PSEUDOPHAKIA OU: Status: ACTIVE | Noted: 2024-11-22

## 2024-11-22 PROCEDURE — 92133 CPTRZD OPH DX IMG PST SGM ON: CPT | Performed by: OPHTHALMOLOGY

## 2024-11-22 PROCEDURE — 92020 GONIOSCOPY: CPT | Performed by: OPHTHALMOLOGY

## 2024-11-22 PROCEDURE — 99214 OFFICE O/P EST MOD 30 MIN: CPT | Performed by: OPHTHALMOLOGY

## 2024-11-22 PROCEDURE — 92083 EXTENDED VISUAL FIELD XM: CPT | Performed by: OPHTHALMOLOGY

## 2024-11-22 ASSESSMENT — SUPERFICIAL PUNCTATE KERATITIS (SPK): OD_SPK: 1+

## 2024-11-22 ASSESSMENT — PACHYMETRY
OD_CT_CORRECTION: -1
OD_CT_UM: 554
OS_CT_UM: 524
OS_CT_CORRECTION: 1

## 2024-11-22 ASSESSMENT — KERATOMETRY
OS_K2POWER_DIOPTERS: 46.00
OD_K1POWER_DIOPTERS: 44.00
OS_K1POWER_DIOPTERS: 44.75
OD_K2POWER_DIOPTERS: 46.00
OS_AXISANGLE_DEGREES: 156
OD_AXISANGLE_DEGREES: 021

## 2024-11-22 ASSESSMENT — REFRACTION_MANIFEST
OD_ADD: +2.75
OD_AXIS: 120
OS_SPHERE: +0.25
OS_VA1: 20/20
OD_AXIS: 125
OU_VA: 20/15-2
OD_VA1: 20/20
OD_CYLINDER: -1.25
OD_VA1: 20/20
OD_CYLINDER: -1.25
OS_AXIS: 85
OD_VA2: 20/20
OS_CYLINDER: -0.75
OS_ADD: +2.75
OD_SPHERE: +0.50
OD_SPHERE: +0.75

## 2024-11-22 ASSESSMENT — REFRACTION_AUTOREFRACTION
OD_CYLINDER: -1.50
OD_AXIS: 105
OD_SPHERE: +0.25
OS_CYLINDER: -1.50
OS_AXIS: 065
OS_SPHERE: +0.25

## 2024-11-22 ASSESSMENT — REFRACTION_CURRENTRX
OS_OVR_VA: 20/
OD_CYLINDER: -1.25
OD_OVR_VA: 20/
OS_SPHERE: +0.25
OD_AXIS: 130
OS_AXIS: 092
OS_CYLINDER: -1.00
OD_SPHERE: +0.50

## 2024-11-22 ASSESSMENT — CONFRONTATIONAL VISUAL FIELD TEST (CVF)
OS_FINDINGS: FULL
OD_FINDINGS: FULL

## 2024-11-22 ASSESSMENT — VISUAL ACUITY
OS_BCVA: 20/20-2
OD_BCVA: 20/20-2

## 2024-11-25 ENCOUNTER — APPOINTMENT (OUTPATIENT)
Dept: ORTHOPEDIC SURGERY | Facility: AMBULATORY SURGERY CENTER | Age: 79
End: 2024-11-25

## 2024-11-25 ENCOUNTER — APPOINTMENT (OUTPATIENT)
Dept: ORTHOPEDIC SURGERY | Facility: CLINIC | Age: 79
End: 2024-11-25

## 2024-11-25 PROCEDURE — 26480 TRANSPLANT HAND TENDON: CPT | Mod: LT

## 2024-11-25 PROCEDURE — 26449 RELEASE FOREARM/HAND TENDON: CPT | Mod: LT

## 2024-11-25 PROCEDURE — 26483 TRANSPLANT/GRAFT HAND TENDON: CPT | Mod: LT

## 2024-11-25 PROCEDURE — 25240 PARTIAL REMOVAL OF ULNA: CPT | Mod: LT

## 2024-11-25 RX ORDER — OXYCODONE AND ACETAMINOPHEN 5; 325 MG/1; MG/1
5-325 TABLET ORAL
Qty: 25 | Refills: 0 | Status: ACTIVE | COMMUNITY
Start: 2024-11-25 | End: 1900-01-01

## 2024-12-02 ENCOUNTER — APPOINTMENT (OUTPATIENT)
Dept: ORTHOPEDIC SURGERY | Facility: CLINIC | Age: 79
End: 2024-12-02
Payer: MEDICARE

## 2024-12-02 PROCEDURE — 29125 APPL SHORT ARM SPLINT STATIC: CPT | Mod: 58,LT

## 2024-12-02 PROCEDURE — 99024 POSTOP FOLLOW-UP VISIT: CPT

## 2024-12-05 ENCOUNTER — APPOINTMENT (OUTPATIENT)
Dept: ORTHOPEDIC SURGERY | Facility: CLINIC | Age: 79
End: 2024-12-05
Payer: MEDICARE

## 2024-12-05 DIAGNOSIS — S66.812A STRAIN OF OTHER SPECIFIED MUSCLES, FASCIA AND TENDONS AT WRIST AND HAND LEVEL, LEFT HAND, INITIAL ENCOUNTER: ICD-10-CM

## 2024-12-05 DIAGNOSIS — M19.132 POST-TRAUMATIC OSTEOARTHRITIS, LEFT WRIST: ICD-10-CM

## 2024-12-05 PROCEDURE — 99024 POSTOP FOLLOW-UP VISIT: CPT

## 2024-12-23 ENCOUNTER — APPOINTMENT (OUTPATIENT)
Dept: ORTHOPEDIC SURGERY | Facility: CLINIC | Age: 79
End: 2024-12-23
Payer: MEDICARE

## 2024-12-23 DIAGNOSIS — M19.132 POST-TRAUMATIC OSTEOARTHRITIS, LEFT WRIST: ICD-10-CM

## 2024-12-23 DIAGNOSIS — S66.812A STRAIN OF OTHER SPECIFIED MUSCLES, FASCIA AND TENDONS AT WRIST AND HAND LEVEL, LEFT HAND, INITIAL ENCOUNTER: ICD-10-CM

## 2024-12-23 PROCEDURE — 99024 POSTOP FOLLOW-UP VISIT: CPT

## 2025-01-23 ENCOUNTER — APPOINTMENT (OUTPATIENT)
Dept: RADIATION ONCOLOGY | Facility: CLINIC | Age: 80
End: 2025-01-23

## 2025-01-23 VITALS
OXYGEN SATURATION: 100 % | HEART RATE: 74 BPM | SYSTOLIC BLOOD PRESSURE: 150 MMHG | RESPIRATION RATE: 16 BRPM | TEMPERATURE: 97 F | DIASTOLIC BLOOD PRESSURE: 74 MMHG | HEIGHT: 64 IN | WEIGHT: 129.63 LBS | BODY MASS INDEX: 22.13 KG/M2

## 2025-01-23 PROCEDURE — G2211 COMPLEX E/M VISIT ADD ON: CPT

## 2025-01-23 PROCEDURE — 99212 OFFICE O/P EST SF 10 MIN: CPT

## 2025-02-03 ENCOUNTER — APPOINTMENT (OUTPATIENT)
Dept: ORTHOPEDIC SURGERY | Facility: CLINIC | Age: 80
End: 2025-02-03
Payer: MEDICARE

## 2025-02-03 DIAGNOSIS — S66.812A STRAIN OF OTHER SPECIFIED MUSCLES, FASCIA AND TENDONS AT WRIST AND HAND LEVEL, LEFT HAND, INITIAL ENCOUNTER: ICD-10-CM

## 2025-02-03 DIAGNOSIS — M65.352 TRIGGER FINGER, LEFT LITTLE FINGER: ICD-10-CM

## 2025-02-03 DIAGNOSIS — M19.132 POST-TRAUMATIC OSTEOARTHRITIS, LEFT WRIST: ICD-10-CM

## 2025-02-03 PROCEDURE — 99024 POSTOP FOLLOW-UP VISIT: CPT

## 2025-03-18 ENCOUNTER — APPOINTMENT (OUTPATIENT)
Dept: ORTHOPEDIC SURGERY | Facility: CLINIC | Age: 80
End: 2025-03-18
Payer: MEDICARE

## 2025-03-18 DIAGNOSIS — S66.812A STRAIN OF OTHER SPECIFIED MUSCLES, FASCIA AND TENDONS AT WRIST AND HAND LEVEL, LEFT HAND, INITIAL ENCOUNTER: ICD-10-CM

## 2025-03-18 DIAGNOSIS — M19.132 POST-TRAUMATIC OSTEOARTHRITIS, LEFT WRIST: ICD-10-CM

## 2025-03-18 DIAGNOSIS — M65.352 TRIGGER FINGER, LEFT LITTLE FINGER: ICD-10-CM

## 2025-03-18 PROCEDURE — 99213 OFFICE O/P EST LOW 20 MIN: CPT

## 2025-03-25 NOTE — H&P PST ADULT - HISTORY OF COVID-19 VACCINATION
3/25/2025      Dear Martinez,    Your health is very important to us. Our records show you are due for an appointment for:   Hypertension  Complete Physical Exam    According to our records, you are due for the following preventive screening(s):    Health Maintenance Due   Topic Date Due    Shingles Vaccine (1 of 2) Never done    Pneumococcal Vaccine 50+ (1 of 1 - PCV) Never done    Influenza Vaccine (1) 09/01/2024    COVID-19 Vaccine (1 - 2024-25 season) Never done    Depression Screening  12/19/2024       If you have had any of the above vaccines or services elsewhere, or have another Primary Care Provider, please contact our office so we can update your records. If you have not had any of the vaccines or services listed above, please contact our office at Dept: [phone] to schedule an appointment, or you may schedule using the [facility] ashwini.     Sincerely,       Ryan Boyd, [address] [address]  Dept: [phone]  Dept Fax: [phone]     Enclosures:    Pneumococcal Vaccination  There are 2 pneumococcal vaccinations that protect people against pneumococcal disease.  Pneumococcal disease  Pneumococcal disease is caused by a bacteria (Streptococcus pneumoniae). This germ is easily spread when someone with the bacteria coughs, sneezes, laughs, or talks. You can get pneumococcal disease more than once. This is because there are many different types (strains) of the bacteria. Some strains are also resistant to treatment with antibiotics.  There are different kinds of pneumococcal disease depending on what part of the body is infected, they include:  Pneumonia. Infection in the lungs  Meningitis. Infection of the covering of the brain and spinal cord  Otitis media. Infection of the middle ear  Bacteremia or septicemia. Infection in the blood  Pneumococcal disease can be life-threatening, especially for people in high-risk groups. Each year,  thousands of people die of this disease and thousands more become seriously ill.  The vaccine    The pneumococcal vaccines are the best way to avoid pneumococcal disease. They are safe and effective. The vaccine are given as shots (injections). This can be done at your health care provider's office or a health clinic. Drugstores, senior centers, and workplaces often offer vaccinations, too. If you have questions about getting vaccinated, ask your health care provider.  The pneumococcal vaccine are recommended for:  Persons 65 and older  Infants  People with chronic health problems (such as diabetes, chronic lung or heart disease, liver disease); or who have a cochlear implant  People who have weakened immune systems  People who live in nursing homes or other long-term care facilities  People who smoke or have asthma  They are given:  In a 4-dose series in infants  One time in adults, some people need a second dose of one of the vaccines  Your health care provider can tell you more about the vaccines, whether you should get them, and the number of shots you should receive.  © 1506-1633 [employer]. [address] [place] [address], [place] [address]. All rights reserved. This information is not intended as a substitute for professional medical care. Always follow your healthcare professional's instructions.       Yes

## 2025-04-15 ENCOUNTER — NON-APPOINTMENT (OUTPATIENT)
Age: 80
End: 2025-04-15

## 2025-05-06 ENCOUNTER — APPOINTMENT (OUTPATIENT)
Dept: GYNECOLOGIC ONCOLOGY | Facility: CLINIC | Age: 80
End: 2025-05-06
Payer: MEDICARE

## 2025-05-06 VITALS
HEART RATE: 71 BPM | TEMPERATURE: 97.3 F | OXYGEN SATURATION: 97 % | HEIGHT: 64 IN | SYSTOLIC BLOOD PRESSURE: 145 MMHG | BODY MASS INDEX: 22.67 KG/M2 | DIASTOLIC BLOOD PRESSURE: 76 MMHG | WEIGHT: 132.8 LBS

## 2025-05-06 DIAGNOSIS — Z92.3 PERSONAL HISTORY OF IRRADIATION: ICD-10-CM

## 2025-05-06 DIAGNOSIS — C54.1 MALIGNANT NEOPLASM OF ENDOMETRIUM: ICD-10-CM

## 2025-05-06 PROCEDURE — 99459 PELVIC EXAMINATION: CPT

## 2025-05-06 PROCEDURE — 99213 OFFICE O/P EST LOW 20 MIN: CPT

## 2025-05-06 PROCEDURE — G2211 COMPLEX E/M VISIT ADD ON: CPT

## 2025-07-29 ENCOUNTER — NON-APPOINTMENT (OUTPATIENT)
Age: 80
End: 2025-07-29

## 2025-07-30 ENCOUNTER — NON-APPOINTMENT (OUTPATIENT)
Age: 80
End: 2025-07-30

## 2025-07-31 ENCOUNTER — APPOINTMENT (OUTPATIENT)
Dept: RADIATION ONCOLOGY | Facility: CLINIC | Age: 80
End: 2025-07-31
Payer: MEDICARE

## 2025-07-31 VITALS
HEART RATE: 69 BPM | HEIGHT: 64 IN | RESPIRATION RATE: 16 BRPM | OXYGEN SATURATION: 98 % | DIASTOLIC BLOOD PRESSURE: 69 MMHG | SYSTOLIC BLOOD PRESSURE: 150 MMHG | TEMPERATURE: 96.98 F

## 2025-07-31 DIAGNOSIS — Z92.3 PERSONAL HISTORY OF IRRADIATION: ICD-10-CM

## 2025-07-31 DIAGNOSIS — Z08 ENCOUNTER FOR FOLLOW-UP EXAMINATION AFTER COMPLETED TREATMENT FOR MALIGNANT NEOPLASM: ICD-10-CM

## 2025-07-31 DIAGNOSIS — Z85.42 ENCOUNTER FOR FOLLOW-UP EXAMINATION AFTER COMPLETED TREATMENT FOR MALIGNANT NEOPLASM: ICD-10-CM

## 2025-07-31 PROCEDURE — G2211 COMPLEX E/M VISIT ADD ON: CPT

## 2025-07-31 PROCEDURE — 99212 OFFICE O/P EST SF 10 MIN: CPT | Mod: GC

## 2025-08-06 ENCOUNTER — NON-APPOINTMENT (OUTPATIENT)
Age: 80
End: 2025-08-06

## (undated) DEVICE — DRSG MASTISOL

## (undated) DEVICE — LIGASURE IMPACT

## (undated) DEVICE — GLV 6.5 PROTEXIS (BLUE)

## (undated) DEVICE — MEDICINE CUP WITH LID 60ML

## (undated) DEVICE — STAPLER SKIN VISI-STAT 35 WIDE

## (undated) DEVICE — SUT VICRYL 0 36" CT-1 UNDYED

## (undated) DEVICE — DRAPE FLUID WARMER 44 X 44"

## (undated) DEVICE — POSITIONER FOAM EGG CRATE ULNAR 2PCS (PINK)

## (undated) DEVICE — POSITIONER STRAP ARMBOARD VELCRO TS-30

## (undated) DEVICE — SOL IRR POUR NS 0.9% 500ML

## (undated) DEVICE — SPONGE PEANUT AUTO COUNT

## (undated) DEVICE — SUT MAXON 1 36" GS-24

## (undated) DEVICE — SOL IRR POUR H2O 1500ML

## (undated) DEVICE — DRAPE 3/4 SHEET 52X76"

## (undated) DEVICE — PACK D&C

## (undated) DEVICE — POSITIONER PINK PAD PIGAZZI SYSTEM

## (undated) DEVICE — Device

## (undated) DEVICE — ELCTR REM POLYHESIVE ADULT PT RETURN 15FT

## (undated) DEVICE — SOL IRR POUR NS 0.9% 1500ML

## (undated) DEVICE — BASIN SET SINGLE

## (undated) DEVICE — SUT VICRYL 3-0 27" SH UNDYED

## (undated) DEVICE — CANISTER DISPOSABLE THIN WALL 3000CC

## (undated) DEVICE — DRSG MEDIPORE + PAD 3.5X10"

## (undated) DEVICE — ELCTR BOVIE BLADE 3/4" EXTENDED LENGTH 6"

## (undated) DEVICE — SOL IRR POUR H2O 250ML

## (undated) DEVICE — WARMING BLANKET UPPER ADULT

## (undated) DEVICE — SUT VICRYL 0 18" TIES UNDYED

## (undated) DEVICE — DRSG STERISTRIPS 0.5 X 4"

## (undated) DEVICE — SUT QUILL MONODERM 3-0 30CM PS-2

## (undated) DEVICE — ELCTR BOVIE PENCIL SMOKE EVACUATION

## (undated) DEVICE — PACK PERI GYN

## (undated) DEVICE — SUT SILK 3-0 18" SH (POP-OFF)

## (undated) DEVICE — SUT NOVAFIL 2 60" T-60

## (undated) DEVICE — SUT VICRYL 2-0 27" SH UNDYED

## (undated) DEVICE — DRAPE INSTRUMENT POUCH 6.75" X 11"

## (undated) DEVICE — VENODYNE/SCD SLEEVE CALF MEDIUM

## (undated) DEVICE — PREP BETADINE SPONGE STICKS

## (undated) DEVICE — POOLE SUCTION TIP

## (undated) DEVICE — ELCTR GROUNDING PAD ADULT COVIDIEN

## (undated) DEVICE — WARMING BLANKET FULL UNDERBODY

## (undated) DEVICE — GLV 6.5 PROTEXIS W HYDROGEL

## (undated) DEVICE — LAP PAD 18 X 18"

## (undated) DEVICE — SYR ASEPTO

## (undated) DEVICE — PACK MAJOR ABDOMINAL WITH LAP

## (undated) DEVICE — GOWN XL

## (undated) DEVICE — DRAPE SPY PHI

## (undated) DEVICE — SUT VICRYL 2-0 18" TIES UNDYED

## (undated) DEVICE — PROTECTOR HEEL / ELBOW FLUFFY

## (undated) DEVICE — DRSG TELFA 3 X 8

## (undated) DEVICE — FOLEY TRAY 16FR 5CC LF UMETER CLOSED